# Patient Record
Sex: MALE | Race: WHITE | NOT HISPANIC OR LATINO | Employment: OTHER | URBAN - METROPOLITAN AREA
[De-identification: names, ages, dates, MRNs, and addresses within clinical notes are randomized per-mention and may not be internally consistent; named-entity substitution may affect disease eponyms.]

---

## 2023-09-28 DIAGNOSIS — D75.1 POLYCYTHEMIA: Primary | ICD-10-CM

## 2023-10-16 DIAGNOSIS — D45 PV (POLYCYTHEMIA VERA) (MULTI): Primary | ICD-10-CM

## 2023-10-16 RX ORDER — HYDROXYUREA 500 MG/1
CAPSULE ORAL
COMMUNITY
End: 2024-01-23 | Stop reason: SDUPTHER

## 2023-10-16 RX ORDER — GABAPENTIN 300 MG/1
600 CAPSULE ORAL 3 TIMES DAILY
Qty: 180 CAPSULE | Refills: 2 | Status: SHIPPED | OUTPATIENT
Start: 2023-10-16 | End: 2024-01-23 | Stop reason: SDUPTHER

## 2023-10-16 RX ORDER — GABAPENTIN 300 MG/1
600 CAPSULE ORAL 3 TIMES DAILY
COMMUNITY
End: 2023-10-16 | Stop reason: SDUPTHER

## 2023-11-29 PROBLEM — M25.571 RIGHT ANKLE PAIN: Status: ACTIVE | Noted: 2023-11-29

## 2023-11-29 PROBLEM — I73.81 ERYTHROMELALGIA (CMS-HCC): Status: ACTIVE | Noted: 2023-11-29

## 2023-11-29 PROBLEM — R13.19 ESOPHAGEAL DYSPHAGIA: Status: ACTIVE | Noted: 2023-11-29

## 2023-11-29 PROBLEM — R20.9 SENSORY DISTURBANCE: Status: ACTIVE | Noted: 2023-11-29

## 2023-11-29 PROBLEM — D45 POLYCYTHEMIA VERA (MULTI): Status: ACTIVE | Noted: 2023-11-29

## 2023-11-29 PROBLEM — G89.29 CHRONIC PAIN IN RIGHT FOOT: Status: ACTIVE | Noted: 2023-11-29

## 2023-11-29 PROBLEM — M79.671 CHRONIC PAIN IN RIGHT FOOT: Status: ACTIVE | Noted: 2023-11-29

## 2023-11-29 PROBLEM — M21.40 FLAT FOOT: Status: ACTIVE | Noted: 2023-11-29

## 2023-11-29 PROBLEM — R10.13 EPIGASTRIC PAIN: Status: ACTIVE | Noted: 2023-11-29

## 2023-11-29 PROBLEM — R12 PYROSIS: Status: ACTIVE | Noted: 2023-11-29

## 2023-11-29 RX ORDER — CYPROHEPTADINE HYDROCHLORIDE 4 MG/1
4 TABLET ORAL 3 TIMES DAILY
COMMUNITY
Start: 2023-09-30 | End: 2024-02-29 | Stop reason: SDUPTHER

## 2023-11-29 RX ORDER — HYDROXYZINE HYDROCHLORIDE 25 MG/1
1 TABLET, FILM COATED ORAL 3 TIMES DAILY PRN
COMMUNITY
Start: 2021-05-17

## 2023-11-29 RX ORDER — SODIUM CHLORIDE FOR INHALATION 3 %
VIAL, NEBULIZER (ML) INHALATION
COMMUNITY
Start: 2023-05-08

## 2023-11-29 RX ORDER — ALBUTEROL SULFATE 0.83 MG/ML
SOLUTION RESPIRATORY (INHALATION)
COMMUNITY
Start: 2023-09-04

## 2023-11-29 RX ORDER — SIMVASTATIN 20 MG/1
20 TABLET, FILM COATED ORAL NIGHTLY
COMMUNITY

## 2023-11-29 RX ORDER — LINACLOTIDE 72 UG/1
CAPSULE, GELATIN COATED ORAL
COMMUNITY
Start: 2023-10-03

## 2023-11-29 RX ORDER — AMOXICILLIN 500 MG/1
CAPSULE ORAL
COMMUNITY
Start: 2022-12-15 | End: 2024-02-01 | Stop reason: ALTCHOICE

## 2023-11-29 RX ORDER — DIAZEPAM 5 MG/1
TABLET ORAL
COMMUNITY

## 2023-11-29 RX ORDER — NIRMATRELVIR AND RITONAVIR 300-100 MG
KIT ORAL
COMMUNITY
Start: 2023-08-15 | End: 2023-11-30 | Stop reason: ALTCHOICE

## 2023-11-29 RX ORDER — NITROFURANTOIN 25; 75 MG/1; MG/1
100 CAPSULE ORAL 2 TIMES DAILY
COMMUNITY
Start: 2023-01-18 | End: 2023-01-25

## 2023-11-29 RX ORDER — DIPHENHYDRAMINE HCL 25 MG
TABLET ORAL
COMMUNITY
Start: 2021-05-17

## 2023-11-29 RX ORDER — ALBUTEROL SULFATE 90 UG/1
AEROSOL, METERED RESPIRATORY (INHALATION) EVERY 6 HOURS
COMMUNITY
Start: 2021-05-17

## 2023-11-29 RX ORDER — CHLORHEXIDINE GLUCONATE ORAL RINSE 1.2 MG/ML
SOLUTION DENTAL
COMMUNITY
Start: 2023-09-02

## 2023-11-29 RX ORDER — ESOMEPRAZOLE MAGNESIUM 40 MG/1
1 CAPSULE, DELAYED RELEASE ORAL DAILY
COMMUNITY
Start: 2017-07-11

## 2023-11-29 RX ORDER — FAMOTIDINE 40 MG/1
TABLET, FILM COATED ORAL
COMMUNITY
Start: 2023-02-20

## 2023-11-30 ENCOUNTER — TELEPHONE (OUTPATIENT)
Dept: HEMATOLOGY/ONCOLOGY | Facility: HOSPITAL | Age: 68
End: 2023-11-30

## 2023-11-30 ENCOUNTER — OFFICE VISIT (OUTPATIENT)
Dept: HEMATOLOGY/ONCOLOGY | Facility: CLINIC | Age: 68
End: 2023-11-30
Payer: MEDICARE

## 2023-11-30 ENCOUNTER — LAB (OUTPATIENT)
Dept: LAB | Facility: CLINIC | Age: 68
End: 2023-11-30
Payer: MEDICARE

## 2023-11-30 VITALS
RESPIRATION RATE: 18 BRPM | BODY MASS INDEX: 28.86 KG/M2 | OXYGEN SATURATION: 96 % | DIASTOLIC BLOOD PRESSURE: 74 MMHG | SYSTOLIC BLOOD PRESSURE: 111 MMHG | HEART RATE: 88 BPM | TEMPERATURE: 97.9 F | WEIGHT: 186.07 LBS

## 2023-11-30 DIAGNOSIS — D45 POLYCYTHEMIA VERA (MULTI): Primary | ICD-10-CM

## 2023-11-30 DIAGNOSIS — D75.1 POLYCYTHEMIA: ICD-10-CM

## 2023-11-30 LAB
ALBUMIN SERPL BCP-MCNC: 4.4 G/DL (ref 3.4–5)
ALP SERPL-CCNC: 56 U/L (ref 33–136)
ALT SERPL W P-5'-P-CCNC: 17 U/L (ref 10–52)
ANION GAP SERPL CALC-SCNC: 10 MMOL/L (ref 10–20)
AST SERPL W P-5'-P-CCNC: 23 U/L (ref 9–39)
BASOPHILS # BLD AUTO: 0.03 X10*3/UL (ref 0–0.1)
BASOPHILS NFR BLD AUTO: 1.1 %
BILIRUB SERPL-MCNC: 1.9 MG/DL (ref 0–1.2)
BUN SERPL-MCNC: 20 MG/DL (ref 6–23)
CALCIUM SERPL-MCNC: 9.3 MG/DL (ref 8.6–10.3)
CHLORIDE SERPL-SCNC: 106 MMOL/L (ref 98–107)
CO2 SERPL-SCNC: 27 MMOL/L (ref 21–32)
CREAT SERPL-MCNC: 0.83 MG/DL (ref 0.5–1.3)
DACRYOCYTES BLD QL SMEAR: NORMAL
EOSINOPHIL # BLD AUTO: 0.07 X10*3/UL (ref 0–0.7)
EOSINOPHIL NFR BLD AUTO: 2.5 %
ERYTHROCYTE [DISTWIDTH] IN BLOOD BY AUTOMATED COUNT: 17 % (ref 11.5–14.5)
GFR SERPL CREATININE-BSD FRML MDRD: >90 ML/MIN/1.73M*2
GLUCOSE SERPL-MCNC: 104 MG/DL (ref 74–99)
HCT VFR BLD AUTO: 25.5 % (ref 41–52)
HGB BLD-MCNC: 8.6 G/DL (ref 13.5–17.5)
IMM GRANULOCYTES # BLD AUTO: 0.02 X10*3/UL (ref 0–0.7)
IMM GRANULOCYTES NFR BLD AUTO: 0.7 % (ref 0–0.9)
LYMPHOCYTES # BLD AUTO: 0.98 X10*3/UL (ref 1.2–4.8)
LYMPHOCYTES NFR BLD AUTO: 34.9 %
MCH RBC QN AUTO: 48 PG (ref 26–34)
MCHC RBC AUTO-ENTMCNC: 33.7 G/DL (ref 32–36)
MCV RBC AUTO: 143 FL (ref 80–100)
MONOCYTES # BLD AUTO: 0.1 X10*3/UL (ref 0.1–1)
MONOCYTES NFR BLD AUTO: 3.6 %
NEUTROPHILS # BLD AUTO: 1.61 X10*3/UL (ref 1.2–7.7)
NEUTROPHILS NFR BLD AUTO: 57.2 %
NRBC BLD-RTO: ABNORMAL /100{WBCS}
OVALOCYTES BLD QL SMEAR: NORMAL
PATH REVIEW-CBC DIFFERENTIAL: NORMAL
PLATELET # BLD AUTO: 128 X10*3/UL (ref 150–450)
POLYCHROMASIA BLD QL SMEAR: NORMAL
POTASSIUM SERPL-SCNC: 4.4 MMOL/L (ref 3.5–5.3)
PROT SERPL-MCNC: 7.3 G/DL (ref 6.4–8.2)
RBC # BLD AUTO: 1.79 X10*6/UL (ref 4.5–5.9)
RBC MORPH BLD: NORMAL
SODIUM SERPL-SCNC: 139 MMOL/L (ref 136–145)
STOMATOCYTES BLD QL SMEAR: NORMAL
WBC # BLD AUTO: 2.8 X10*3/UL (ref 4.4–11.3)

## 2023-11-30 PROCEDURE — 80053 COMPREHEN METABOLIC PANEL: CPT

## 2023-11-30 PROCEDURE — 1126F AMNT PAIN NOTED NONE PRSNT: CPT | Performed by: INTERNAL MEDICINE

## 2023-11-30 PROCEDURE — 1159F MED LIST DOCD IN RCRD: CPT | Performed by: INTERNAL MEDICINE

## 2023-11-30 PROCEDURE — 36415 COLL VENOUS BLD VENIPUNCTURE: CPT

## 2023-11-30 PROCEDURE — 1036F TOBACCO NON-USER: CPT | Performed by: INTERNAL MEDICINE

## 2023-11-30 PROCEDURE — 99214 OFFICE O/P EST MOD 30 MIN: CPT | Performed by: INTERNAL MEDICINE

## 2023-11-30 PROCEDURE — 85025 COMPLETE CBC W/AUTO DIFF WBC: CPT

## 2023-11-30 PROCEDURE — 85060 BLOOD SMEAR INTERPRETATION: CPT | Performed by: PATHOLOGY

## 2023-11-30 ASSESSMENT — ENCOUNTER SYMPTOMS
JOINT SWELLING: 0
ADENOPATHY: 0
ACTIVITY CHANGE: 0
CONFUSION: 0
SINUS PAIN: 0
NAUSEA: 0
COUGH: 0
RHINORRHEA: 0
FEVER: 0
CHILLS: 0
BRUISES/BLEEDS EASILY: 0
BACK PAIN: 0
CONSTIPATION: 0
UNEXPECTED WEIGHT CHANGE: 0
NERVOUS/ANXIOUS: 0
FLANK PAIN: 0
SINUS PRESSURE: 0
DIAPHORESIS: 0
OCCASIONAL FEELINGS OF UNSTEADINESS: 0
VOMITING: 0
SORE THROAT: 0
WEAKNESS: 0
NUMBNESS: 0
DYSPHORIC MOOD: 0
DIFFICULTY URINATING: 0
HEADACHES: 0
LOSS OF SENSATION IN FEET: 0
FATIGUE: 0
DIARRHEA: 0
ABDOMINAL PAIN: 0
SHORTNESS OF BREATH: 0
LIGHT-HEADEDNESS: 0
APPETITE CHANGE: 0
DEPRESSION: 0

## 2023-11-30 ASSESSMENT — PAIN SCALES - GENERAL: PAINLEVEL: 0-NO PAIN

## 2023-11-30 NOTE — TELEPHONE ENCOUNTER
Patient CBC at the end of his clinic today resulted after patient left, patient CBC demonstrated hemoglobin of 8.6, white blood cells 2.8, platelets 120s.  Overall this is significantly lower than his recent trends where he previously been stable on hydroxyurea doses of 1000 mg twice daily.    Attempted to call patient and ask him to temporarily hold his hydroxyurea with potential restart after we remonitor in 2 weeks.  We put in labs for 2 weeks.  I attempted both the home number and the mobile number, no answer at both.  Left a message with the mobile number.  I left a request that the patient call back and acknowledge receipt of instructions to hold his medicine.

## 2023-11-30 NOTE — PROGRESS NOTES
Patient ID: Mark Maria is a 68 y.o. male.  Referring Physician: Jeanie To, APRN-CNP  [address] [place] [address]  Primary Care Provider: Saeid Allen MD    Date of Service:  11/30/2023    ASSESSMENT and PLAN:      Problem List Items Addressed This Visit       Polycythemia vera (CMS/HCC) - Primary    Current Assessment & Plan     Reports that he feels well, and seemed overall stable on his dose, but unfortunately his labs returned showing a new onset anemia, borderline thrombocytopenia, and borderline leukopenia.  This is highly likely from the hydroxyurea, tried to contact patient after his appointment have him hold his hydroxyurea, will have him come back in 2 weeks to potentially dose.  We will likely a dose reduction.  Given his history of sensitivity will likely restart it 500 twice a day, consider up titration to 1500/day  Polycythemia vera (some evidence  of clonal evolution with a new SF3B1 mutations)  Diagnostics:  - Bone marrow biopsy on 3/24/22 - no evidence of myelofibrosis progression   Treatment:  - HOLD hydroxyrrea  Disease Monitoring:  - None at this time   Supportive Care/Toxicity Management  - Cyproheptadine 4mg 3x daily for itching  - continue prn diazepam for itching prevention with showers  - Gabapentin 600mg 3x daily   - Oral B12 1000mcg daily   Antimicrobial Prophylaxis:  - None at this time   IV access:  - Peripheral Access only          Relevant Orders    CBC and Auto Differential     Other Visit Diagnoses       Polycythemia        Relevant Orders    Clinic Appointment Request JEANIE TO    Infusion Appointment Request [facility] INFUSION    CBC and Auto Differential (Completed)    Comprehensive Metabolic Panel (Completed)                      Oncology History Overview Note   Polycythemia Vera:   diagnosis:  originally referred to hematology 7/2017 with elevated red cell mass, mild leukocytosis and elevated platelets.  He had itching and burning  symptoms that were worsening with baths as wlel.  No history of thrombosis at the time of presentation.  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  Treatment:  1. hydroxyurea single agent   Well tolerated wtih reasonable control of disease through Fall 2021, when blood counts dropped consdierably, and patient proved exquisitely senstivie to hydroxyurea. Referred to Dr. Ortega with concern for hemolysis, but workup was not suggestive of  introvascular hemolysis. Noted elevated MCV, but did not e low haptoglobin, and was suspicious for stem cell disorder.  peripheral blood testing demonstrated a new SF3B1 mutation in addition to the known JAK2 mutation.       bone marrow biopsy on 3/24/22 showed hypercellular bone marrow (50-60%) with trilineage hematopoiesis and atypical megakaryotytic hyperplasia consistent with a myeloid neoplasm , there was no evidence of myelofibrosis progression.  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  The clinical picture with  nausea counts and symptoms that may be analogous to B-symptoms, and so treatment as primary myelofibrosis was considered.   - restarted hydroxyurea, 500mg/day on 4/7/22  - Currently on hydroxyurea, 1000mg BID 4/20/23  -11/30/2023 - recurring anemia and borderline anemia      Polycythemia vera (CMS/HCC)   11/29/2023 Initial Diagnosis    Polycythemia vera (CMS/HCC)              Subjective       History of Present Illness:  Reviewed overall plan - Oct & Nov were good - no issues with the pruitis, or itching - diazepam still makes a big difference - but using it every single daily.   No fevers, no chills, no illness. Got his covid booster and flu shot.          Reviewed and Past Medical History, Past Surgical History, Family History, and Social History:    Review of Systems   Constitutional:   Negative for activity change, appetite change, chills, diaphoresis, fatigue, fever and unexpected weight change.   HENT:  Negative for congestion, mouth sores, nosebleeds, rhinorrhea, sinus pressure, sinus pain and sore throat.    Eyes:  Negative for visual disturbance.   Respiratory:  Negative for cough and shortness of breath.    Cardiovascular:  Negative for chest pain and leg swelling.   Gastrointestinal:  Negative for abdominal pain, constipation, diarrhea, nausea and vomiting.   Genitourinary:  Negative for difficulty urinating and flank pain.   Musculoskeletal:  Negative for back pain and joint swelling.   Skin:  Negative for rash.        Still has intermittent issues with pruritus, doing much better   Neurological:  Negative for weakness, light-headedness, numbness and headaches.   Hematological:  Negative for adenopathy. Does not bruise/bleed easily.   Psychiatric/Behavioral:  Negative for confusion and dysphoric mood. The patient is not nervous/anxious.        Home Medications and Adherence Reviewed with Patient.       Objective      VS:  /74 (BP Location: Right arm, Patient Position: Sitting, BP Cuff Size: Adult)   Pulse 88   Temp 36.6 °C (97.9 °F) (Temporal)   Resp 18   Wt 84.4 kg (186 lb 1.1 oz)   SpO2 96%   BMI 28.86 kg/m²   BSA: 2 meters squared    Physical Exam  Constitutional:       Appearance: Normal appearance.   HENT:      Mouth/Throat:      Mouth: Mucous membranes are moist.   Eyes:      Conjunctiva/sclera: Conjunctivae normal.      Pupils: Pupils are equal, round, and reactive to light.   Cardiovascular:      Rate and Rhythm: Normal rate and regular rhythm.      Heart sounds: Normal heart sounds.   Pulmonary:      Effort: Pulmonary effort is normal.      Breath sounds: Normal breath sounds.   Abdominal:      General: Abdomen is flat.      Palpations: Abdomen is soft. There is no splenomegaly.   Musculoskeletal:         General: Normal range of motion.   Skin:     General: Skin is  warm and dry.   Neurological:      General: No focal deficit present.      Mental Status: He is oriented to person, place, and time.   Psychiatric:         Mood and Affect: Mood normal.         Behavior: Behavior normal.         Laboratory:  Lab on 11/30/2023   Component Date Value Ref Range Status    WBC 11/30/2023 2.8 (L)  4.4 - 11.3 x10*3/uL Final    nRBC 11/30/2023    Final    RBC 11/30/2023 1.79 (L)  4.50 - 5.90 x10*6/uL Final    Hemoglobin 11/30/2023 8.6 (L)  13.5 - 17.5 g/dL Final    Hematocrit 11/30/2023 25.5 (L)  41.0 - 52.0 % Final    MCV 11/30/2023 143 (H)  80 - 100 fL Final    MCH 11/30/2023 48.0 (H)  26.0 - 34.0 pg Final    MCHC 11/30/2023 33.7  32.0 - 36.0 g/dL Final    RDW 11/30/2023 17.0 (H)  11.5 - 14.5 % Final    Platelets 11/30/2023 128 (L)  150 - 450 x10*3/uL Final    Neutrophils % 11/30/2023 57.2  40.0 - 80.0 % Final    Immature Granulocytes %, Automated 11/30/2023 0.7  0.0 - 0.9 % Final    Lymphocytes % 11/30/2023 34.9  13.0 - 44.0 % Final    Monocytes % 11/30/2023 3.6  2.0 - 10.0 % Final    Eosinophils % 11/30/2023 2.5  0.0 - 6.0 % Final    Basophils % 11/30/2023 1.1  0.0 - 2.0 % Final    Neutrophils Absolute 11/30/2023 1.61  1.20 - 7.70 x10*3/uL Final    Immature Granulocytes Absolute, Au* 11/30/2023 0.02  0.00 - 0.70 x10*3/uL Final    Lymphocytes Absolute 11/30/2023 0.98 (L)  1.20 - 4.80 x10*3/uL Final    Monocytes Absolute 11/30/2023 0.10  0.10 - 1.00 x10*3/uL Final    Eosinophils Absolute 11/30/2023 0.07  0.00 - 0.70 x10*3/uL Final    Basophils Absolute 11/30/2023 0.03  0.00 - 0.10 x10*3/uL Final    Glucose 11/30/2023 104 (H)  74 - 99 mg/dL Final    Sodium 11/30/2023 139  136 - 145 mmol/L Final    Potassium 11/30/2023 4.4  3.5 - 5.3 mmol/L Final    Chloride 11/30/2023 106  98 - 107 mmol/L Final    Bicarbonate 11/30/2023 27  21 - 32 mmol/L Final    Anion Gap 11/30/2023 10  10 - 20 mmol/L Final    Urea Nitrogen 11/30/2023 20  6 - 23 mg/dL Final    Creatinine 11/30/2023 0.83  0.50 - 1.30  mg/dL Final    eGFR 11/30/2023 >90  >60 mL/min/1.73m*2 Final    Calcium 11/30/2023 9.3  8.6 - 10.3 mg/dL Final    Albumin 11/30/2023 4.4  3.4 - 5.0 g/dL Final    Alkaline Phosphatase 11/30/2023 56  33 - 136 U/L Final    Total Protein 11/30/2023 7.3  6.4 - 8.2 g/dL Final    AST 11/30/2023 23  9 - 39 U/L Final    Bilirubin, Total 11/30/2023 1.9 (H)  0.0 - 1.2 mg/dL Final    ALT 11/30/2023 17  10 - 52 U/L Final    RBC Morphology 11/30/2023 See Below   Final    Polychromasia 11/30/2023 Mild   Final    Ovalocytes 11/30/2023 Few   Final    Teardrop Cells 11/30/2023 Few   Final    Stomatocytes 11/30/2023 Few   Final           Moi Andrade MD

## 2023-11-30 NOTE — LETTER
November 30, 2023     Saeid Allen MD  [address] [address]  [facility] [address]    Patient: Mark Maria   YOB: 1955   Date of Visit: 11/30/2023       Dear Dr. Saeid Allen MD:    Thank you for referring Mark Maria to me for evaluation. Below are my notes for this consultation.  If you have questions, please do not hesitate to call me. I look forward to following your patient along with you.       Sincerely,     Moi Andrade MD      CC: No Recipients  ______________________________________________________________________________________    Patient ID: Mark Maria is a 68 y.o. male.  Referring Physician: Jeanie To, APRN-CNP  [address] [place] [address]  Primary Care Provider: Saeid Allen MD    Date of Service:  11/30/2023    ASSESSMENT and PLAN:      Problem List Items Addressed This Visit       Polycythemia vera (CMS/HCC) - Primary    Current Assessment & Plan     Reports that he feels well, and seemed overall stable on his dose, but unfortunately his labs returned showing a new onset anemia, borderline thrombocytopenia, and borderline leukopenia.  This is highly likely from the hydroxyurea, tried to contact patient after his appointment have him hold his hydroxyurea, will have him come back in 2 weeks to potentially dose.  We will likely a dose reduction.  Given his history of sensitivity will likely restart it 500 twice a day, consider up titration to 1500/day  Polycythemia vera (some evidence  of clonal evolution with a new SF3B1 mutations)  Diagnostics:  - Bone marrow biopsy on 3/24/22 - no evidence of myelofibrosis progression   Treatment:  - HOLD hydroxyrrea  Disease Monitoring:  - None at this time   Supportive Care/Toxicity Management  - Cyproheptadine 4mg 3x daily for itching  - continue prn diazepam for itching prevention with showers  - Gabapentin 600mg 3x daily   - Oral B12  1000mcg daily   Antimicrobial Prophylaxis:  - None at this time   IV access:  - Peripheral Access only          Relevant Orders    CBC and Auto Differential     Other Visit Diagnoses       Polycythemia        Relevant Orders    Clinic Appointment Request HUGO ASHRAF    Infusion Appointment Request [facility] INFUSION    CBC and Auto Differential (Completed)    Comprehensive Metabolic Panel (Completed)                      Oncology History Overview Note   Polycythemia Vera:   diagnosis:  originally referred to hematology 7/2017 with elevated red cell mass, mild leukocytosis and elevated platelets.  He had itching and burning symptoms that were worsening with baths as wlel.  No history of thrombosis at the time of presentation.  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  Treatment:  1. hydroxyurea single agent   Well tolerated wtih reasonable control of disease through Fall 2021, when blood counts dropped consdierably, and patient proved exquisitely senstivie to hydroxyurea. Referred to Dr. Ortega with concern for hemolysis, but workup was not suggestive of  introvascular hemolysis. Noted elevated MCV, but did not e low haptoglobin, and was suspicious for stem cell disorder.  peripheral blood testing demonstrated a new SF3B1 mutation in addition to the known JAK2 mutation.       bone marrow biopsy on 3/24/22 showed hypercellular bone marrow (50-60%) with trilineage hematopoiesis and atypical megakaryotytic hyperplasia consistent with a myeloid neoplasm , there was no evidence of myelofibrosis progression.  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  The clinical picture with  nausea counts and symptoms that may be analogous to B-symptoms, and so treatment as primary myelofibrosis was considered.   - restarted hydroxyurea, 500mg/day on  4/7/22  - Currently on hydroxyurea, 1000mg BID 4/20/23  -11/30/2023 - recurring anemia and borderline anemia      Polycythemia vera (CMS/HCC)   11/29/2023 Initial Diagnosis    Polycythemia vera (CMS/HCC)              Subjective      History of Present Illness:  Reviewed overall plan - Oct & Nov were good - no issues with the pruitis, or itching - diazepam still makes a big difference - but using it every single daily.   No fevers, no chills, no illness. Got his covid booster and flu shot.          Reviewed and Past Medical History, Past Surgical History, Family History, and Social History:    Review of Systems   Constitutional:  Negative for activity change, appetite change, chills, diaphoresis, fatigue, fever and unexpected weight change.   HENT:  Negative for congestion, mouth sores, nosebleeds, rhinorrhea, sinus pressure, sinus pain and sore throat.    Eyes:  Negative for visual disturbance.   Respiratory:  Negative for cough and shortness of breath.    Cardiovascular:  Negative for chest pain and leg swelling.   Gastrointestinal:  Negative for abdominal pain, constipation, diarrhea, nausea and vomiting.   Genitourinary:  Negative for difficulty urinating and flank pain.   Musculoskeletal:  Negative for back pain and joint swelling.   Skin:  Negative for rash.        Still has intermittent issues with pruritus, doing much better   Neurological:  Negative for weakness, light-headedness, numbness and headaches.   Hematological:  Negative for adenopathy. Does not bruise/bleed easily.   Psychiatric/Behavioral:  Negative for confusion and dysphoric mood. The patient is not nervous/anxious.        Home Medications and Adherence Reviewed with Patient.       Objective     VS:  /74 (BP Location: Right arm, Patient Position: Sitting, BP Cuff Size: Adult)   Pulse 88   Temp 36.6 °C (97.9 °F) (Temporal)   Resp 18   Wt 84.4 kg (186 lb 1.1 oz)   SpO2 96%   BMI 28.86 kg/m²   BSA: 2 meters squared    Physical  Exam  Constitutional:       Appearance: Normal appearance.   HENT:      Mouth/Throat:      Mouth: Mucous membranes are moist.   Eyes:      Conjunctiva/sclera: Conjunctivae normal.      Pupils: Pupils are equal, round, and reactive to light.   Cardiovascular:      Rate and Rhythm: Normal rate and regular rhythm.      Heart sounds: Normal heart sounds.   Pulmonary:      Effort: Pulmonary effort is normal.      Breath sounds: Normal breath sounds.   Abdominal:      General: Abdomen is flat.      Palpations: Abdomen is soft. There is no splenomegaly.   Musculoskeletal:         General: Normal range of motion.   Skin:     General: Skin is warm and dry.   Neurological:      General: No focal deficit present.      Mental Status: He is oriented to person, place, and time.   Psychiatric:         Mood and Affect: Mood normal.         Behavior: Behavior normal.         Laboratory:  Lab on 11/30/2023   Component Date Value Ref Range Status   • WBC 11/30/2023 2.8 (L)  4.4 - 11.3 x10*3/uL Final   • nRBC 11/30/2023    Final   • RBC 11/30/2023 1.79 (L)  4.50 - 5.90 x10*6/uL Final   • Hemoglobin 11/30/2023 8.6 (L)  13.5 - 17.5 g/dL Final   • Hematocrit 11/30/2023 25.5 (L)  41.0 - 52.0 % Final   • MCV 11/30/2023 143 (H)  80 - 100 fL Final   • MCH 11/30/2023 48.0 (H)  26.0 - 34.0 pg Final   • MCHC 11/30/2023 33.7  32.0 - 36.0 g/dL Final   • RDW 11/30/2023 17.0 (H)  11.5 - 14.5 % Final   • Platelets 11/30/2023 128 (L)  150 - 450 x10*3/uL Final   • Neutrophils % 11/30/2023 57.2  40.0 - 80.0 % Final   • Immature Granulocytes %, Automated 11/30/2023 0.7  0.0 - 0.9 % Final   • Lymphocytes % 11/30/2023 34.9  13.0 - 44.0 % Final   • Monocytes % 11/30/2023 3.6  2.0 - 10.0 % Final   • Eosinophils % 11/30/2023 2.5  0.0 - 6.0 % Final   • Basophils % 11/30/2023 1.1  0.0 - 2.0 % Final   • Neutrophils Absolute 11/30/2023 1.61  1.20 - 7.70 x10*3/uL Final   • Immature Granulocytes Absolute, Au* 11/30/2023 0.02  0.00 - 0.70 x10*3/uL Final   •  Lymphocytes Absolute 11/30/2023 0.98 (L)  1.20 - 4.80 x10*3/uL Final   • Monocytes Absolute 11/30/2023 0.10  0.10 - 1.00 x10*3/uL Final   • Eosinophils Absolute 11/30/2023 0.07  0.00 - 0.70 x10*3/uL Final   • Basophils Absolute 11/30/2023 0.03  0.00 - 0.10 x10*3/uL Final   • Glucose 11/30/2023 104 (H)  74 - 99 mg/dL Final   • Sodium 11/30/2023 139  136 - 145 mmol/L Final   • Potassium 11/30/2023 4.4  3.5 - 5.3 mmol/L Final   • Chloride 11/30/2023 106  98 - 107 mmol/L Final   • Bicarbonate 11/30/2023 27  21 - 32 mmol/L Final   • Anion Gap 11/30/2023 10  10 - 20 mmol/L Final   • Urea Nitrogen 11/30/2023 20  6 - 23 mg/dL Final   • Creatinine 11/30/2023 0.83  0.50 - 1.30 mg/dL Final   • eGFR 11/30/2023 >90  >60 mL/min/1.73m*2 Final   • Calcium 11/30/2023 9.3  8.6 - 10.3 mg/dL Final   • Albumin 11/30/2023 4.4  3.4 - 5.0 g/dL Final   • Alkaline Phosphatase 11/30/2023 56  33 - 136 U/L Final   • Total Protein 11/30/2023 7.3  6.4 - 8.2 g/dL Final   • AST 11/30/2023 23  9 - 39 U/L Final   • Bilirubin, Total 11/30/2023 1.9 (H)  0.0 - 1.2 mg/dL Final   • ALT 11/30/2023 17  10 - 52 U/L Final   • RBC Morphology 11/30/2023 See Below   Final   • Polychromasia 11/30/2023 Mild   Final   • Ovalocytes 11/30/2023 Few   Final   • Teardrop Cells 11/30/2023 Few   Final   • Stomatocytes 11/30/2023 Few   Final           Moi Andrade MD

## 2023-11-30 NOTE — ASSESSMENT & PLAN NOTE
Reports that he feels well, and seemed overall stable on his dose, but unfortunately his labs returned showing a new onset anemia, borderline thrombocytopenia, and borderline leukopenia.  This is highly likely from the hydroxyurea, tried to contact patient after his appointment have him hold his hydroxyurea, will have him come back in 2 weeks to potentially dose.  We will likely a dose reduction.  Given his history of sensitivity will likely restart it 500 twice a day, consider up titration to 1500/day  Polycythemia vera (some evidence  of clonal evolution with a new SF3B1 mutations)  Diagnostics:  - Bone marrow biopsy on 3/24/22 - no evidence of myelofibrosis progression   Treatment:  - HOLD hydroxyrrea  Disease Monitoring:  - None at this time   Supportive Care/Toxicity Management  - Cyproheptadine 4mg 3x daily for itching  - continue prn diazepam for itching prevention with showers  - Gabapentin 600mg 3x daily   - Oral B12 1000mcg daily   Antimicrobial Prophylaxis:  - None at this time   IV access:  - Peripheral Access only

## 2024-01-23 ENCOUNTER — TELEPHONE (OUTPATIENT)
Dept: ADMISSION | Facility: HOSPITAL | Age: 69
End: 2024-01-23
Payer: MEDICARE

## 2024-01-23 DIAGNOSIS — D45 POLYCYTHEMIA VERA (MULTI): Primary | ICD-10-CM

## 2024-01-23 DIAGNOSIS — D45 PV (POLYCYTHEMIA VERA) (MULTI): ICD-10-CM

## 2024-01-23 RX ORDER — HYDROXYUREA 500 MG/1
CAPSULE ORAL
Qty: 60 CAPSULE | Refills: 3 | Status: SHIPPED | OUTPATIENT
Start: 2024-01-23 | End: 2024-03-07 | Stop reason: SDUPTHER

## 2024-01-23 RX ORDER — GABAPENTIN 300 MG/1
600 CAPSULE ORAL 3 TIMES DAILY
Qty: 180 CAPSULE | Refills: 2 | Status: SHIPPED | OUTPATIENT
Start: 2024-01-23 | End: 2024-05-09 | Stop reason: SDUPTHER

## 2024-01-23 NOTE — TELEPHONE ENCOUNTER
Mark Maria called the refill line for Gabapentin. Medication pended to team to approve and submit. Next FUV is 2/1.

## 2024-01-30 NOTE — ASSESSMENT & PLAN NOTE
"Recent hospitalization at [facility] with pancytopenia d/t hydrea toxicity. Transfused 4 units PRBC during admission.  \"Mild splenomegaly\" noted on US 1/30/24. Feeling much better.  Hydrea held    Polycythemia vera (some evidence  of clonal evolution with a new SF3B1 mutations)  Diagnostics:  - Bone marrow biopsy on 3/24/22 - no evidence of myelofibrosis progression   Treatment:  - HOLD hydroxyrea  - CBC Q 2 weeks until counts recover. Repeat Bmbx if no recovery of counts  Disease Monitoring:  - None at this time   Supportive Care/Toxicity Management  - Cyproheptadine 4mg 3x daily for itching  - continue prn diazepam for itching prevention with showers  - Gabapentin 600mg 3x daily   - Oral B12 1000mcg daily   Antimicrobial Prophylaxis:  - None at this time   IV access:  - Peripheral Access only   "

## 2024-01-30 NOTE — PROGRESS NOTES
Patient ID: Mark Maria is a 68 y.o. male.  Referring Physician: Moi Andrade MD  [address] [place] [address]  Primary Care Provider: Saeid Allen MD    Date of Service:  2/1/2024    ASSESSMENT and PLAN:    Polycythemia vera (CMS/HCC)  Recent hospitalization at [facility] with pancytopenia d/t hydrea toxicity. Transfused 4 units PRBC during admission.  Feeling much better.  Hydrea held    Polycythemia vera (some evidence  of clonal evolution with a new SF3B1 mutations)  Diagnostics:  - Bone marrow biopsy on 3/24/22 - no evidence of myelofibrosis progression   Treatment:  - HOLD hydroxyrea  - CBC Q 2 weeks until counts recover. Repeat Bmbx if no recovery of counts  Disease Monitoring:  - None at this time   Supportive Care/Toxicity Management  - Cyproheptadine 4mg 3x daily for itching  - continue prn diazepam for itching prevention with showers  - Gabapentin 600mg 3x daily   - Oral B12 1000mcg daily   Antimicrobial Prophylaxis:  - None at this time   IV access:  - Peripheral Access only        Oncology History Overview Note   Polycythemia Vera:   diagnosis:  originally referred to hematology 7/2017 with elevated red cell mass, mild leukocytosis and elevated platelets.  He had itching and burning symptoms that were worsening with baths as wlel.  No history of thrombosis at the time of presentation.  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  Treatment:  1. hydroxyurea single agent   Well tolerated wtih reasonable control of disease through Fall 2021, when blood counts dropped consdierably, and patient proved exquisitely senstivie to hydroxyurea. Referred to Dr. Ortega with concern for hemolysis, but workup was not suggestive of  introvascular hemolysis. Noted elevated MCV, but did not e low haptoglobin, and was suspicious for stem cell disorder.  peripheral blood testing demonstrated a new SF3B1 mutation in addition to the known JAK2  mutation.       bone marrow biopsy on 3/24/22 showed hypercellular bone marrow (50-60%) with trilineage hematopoiesis and atypical megakaryotytic hyperplasia consistent with a myeloid neoplasm , there was no evidence of myelofibrosis progression.  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  The clinical picture with  nausea counts and symptoms that may be analogous to B-symptoms, and so treatment as primary myelofibrosis was considered.   - restarted hydroxyurea, 500mg/day on 4/7/22  - Currently on hydroxyurea, 1000mg BID 4/20/23  -11/30/2023 - recurring anemia and borderline anemia      Polycythemia vera (CMS/HCC)   11/29/2023 Initial Diagnosis    Polycythemia vera (CMS/HCC)        SUBJECTIVE:  History of Present Illness:  Patient presents today, accompanied by his wife, for follow up.  Last week he started to feel weak and SOB.  On Friday while sitting at his table his wife found him slumped over and called 911.  He was found to have a HGB of 6.7 and was admitted to [facility].  Hydrea was held and he was transfused PRBC.  He was discharged this past Tuesday.  Reports feeling better, although still not at his baseline.  Denies bleeding.          Review of Systems   Constitutional:  Negative for chills, fever and unexpected weight change.   HENT:  Negative for mouth sores and nosebleeds.    Respiratory:  Negative for cough, chest tightness and shortness of breath.    Cardiovascular:  Negative for chest pain and leg swelling.   Gastrointestinal:  Negative for abdominal pain, blood in stool, constipation, diarrhea, nausea and vomiting.   Genitourinary:  Negative for dysuria and hematuria.   Skin:  Negative for rash.   Neurological:  Negative for dizziness, light-headedness, numbness and headaches.     OBJECTIVE:  KPS: Karnofsky Score: 90 - Able to carry on normal activity; minor  signs or symptoms of disease    VS:  /72 (BP Location: Right arm)   Pulse 85   Temp 36.4 °C (97.5 °F) (Temporal)   Resp 18   Wt 84.2 kg (185 lb 10 oz)   SpO2 92%   BMI 28.80 kg/m²   BSA: 2 meters squared    Physical Exam  Constitutional:       Appearance: Normal appearance.   HENT:      Head: Normocephalic.   Eyes:      Pupils: Pupils are equal, round, and reactive to light.   Cardiovascular:      Rate and Rhythm: Normal rate and regular rhythm.   Pulmonary:      Effort: Pulmonary effort is normal.      Breath sounds: Normal breath sounds.   Abdominal:      General: Bowel sounds are normal.      Palpations: Abdomen is soft. There is splenomegaly.      Comments: Spleen palpable just at costal margin   Musculoskeletal:         General: Normal range of motion.      Cervical back: Normal range of motion and neck supple.   Lymphadenopathy:      Comments: No lymphadenopathy   Skin:     General: Skin is warm and dry.      Findings: No lesion or rash.   Neurological:      General: No focal deficit present.      Mental Status: He is alert and oriented to person, place, and time. Mental status is at baseline.      Comments: No numbness or tingling   Psychiatric:         Mood and Affect: Mood normal.       Current Outpatient Medications   Medication Instructions    albuterol 2.5 mg /3 mL (0.083 %) nebulizer solution INHALE THE CONTENTS OF 1 VIAL (3 ML) VIA NEBULIZER TWICE A DAY    albuterol 90 mcg/actuation inhaler inhalation, Every 6 hours    chlorhexidine (Peridex) 0.12 % solution RINSE MOUTH WITH 5 TO 10 ML ONCE DAILY AFTER BRUSHING FOR 1 MINUTE AND SPIT OUT    cyproheptadine (PERIACTIN) 4 mg, oral, 3 times daily    diazePAM (Valium) 5 mg tablet TAKE ONE TABLET BY MOUTH TWO TIMES A DAY AS PREMED FOR SHOWERS AND TRIGGERS FOR NEURO REACTIVITY    diphenhydrAMINE (Sominex) 25 mg tablet oral    esomeprazole (NexIUM) 40 mg DR capsule 1 capsule, oral, Daily    famotidine (Pepcid) 40 mg tablet     gabapentin (NEURONTIN)  600 mg, oral, 3 times daily    hydroxyurea (Hydrea) 500 mg capsule TAKE TWO CAPSULES BY MOUTH ONCE A DAY    hydrOXYzine HCL (Atarax) 25 mg tablet 1 tablet, oral, 3 times daily PRN    Linzess 72 mcg capsule TAKE ONE CAPSULE BY MOUTH ONCE DAILY ON AN EMPTY STOMACH. SWALLOW WHOLE. DO NOT CRUSH OR CHEW    simvastatin (ZOCOR) 20 mg, oral, Nightly    sodium chloride 3 % nebulizer solution INHALE THE CONTENTS OF 1 VIAL (4 ML) VIA NEBULIZER TWICE DAILY      Laboratory:  The pertinent laboratory results were reviewed and discussed with the patient.    Lab Results   Component Value Date    WBC 2.9 (L) 02/01/2024    HCT 26.8 (L) 02/01/2024    HGB 9.0 (L) 02/01/2024     (L) 02/01/2024    K 4.4 02/01/2024    CALCIUM 9.4 02/01/2024     02/01/2024    MG 2.00 02/16/2023    BILITOT 1.5 (H) 02/01/2024    ALT 8 (L) 02/01/2024    AST 18 02/01/2024    BUN 18 02/01/2024    CREATININE 0.81 02/01/2024      Note: for a comprehensive list of the patient's lab results, access the Results Review activity.    RTC:  Labs in 2 weeks  3/7 Dr. Andrade follow up    Jeanie To, APRN-CNP

## 2024-02-01 ENCOUNTER — OFFICE VISIT (OUTPATIENT)
Dept: HEMATOLOGY/ONCOLOGY | Facility: CLINIC | Age: 69
End: 2024-02-01
Payer: MEDICARE

## 2024-02-01 ENCOUNTER — LAB (OUTPATIENT)
Dept: LAB | Facility: CLINIC | Age: 69
End: 2024-02-01
Payer: MEDICARE

## 2024-02-01 ENCOUNTER — INFUSION (OUTPATIENT)
Dept: HEMATOLOGY/ONCOLOGY | Facility: CLINIC | Age: 69
End: 2024-02-01
Payer: MEDICARE

## 2024-02-01 VITALS
OXYGEN SATURATION: 92 % | BODY MASS INDEX: 28.8 KG/M2 | DIASTOLIC BLOOD PRESSURE: 72 MMHG | TEMPERATURE: 97.5 F | RESPIRATION RATE: 18 BRPM | SYSTOLIC BLOOD PRESSURE: 109 MMHG | HEART RATE: 85 BPM | WEIGHT: 185.63 LBS

## 2024-02-01 DIAGNOSIS — D45 POLYCYTHEMIA VERA (MULTI): Primary | ICD-10-CM

## 2024-02-01 DIAGNOSIS — D75.1 POLYCYTHEMIA: ICD-10-CM

## 2024-02-01 DIAGNOSIS — D45 POLYCYTHEMIA VERA (MULTI): ICD-10-CM

## 2024-02-01 PROBLEM — R10.13 EPIGASTRIC PAIN: Status: RESOLVED | Noted: 2023-11-29 | Resolved: 2024-02-01

## 2024-02-01 PROBLEM — R13.19 ESOPHAGEAL DYSPHAGIA: Status: RESOLVED | Noted: 2023-11-29 | Resolved: 2024-02-01

## 2024-02-01 PROBLEM — M25.571 RIGHT ANKLE PAIN: Status: RESOLVED | Noted: 2023-11-29 | Resolved: 2024-02-01

## 2024-02-01 LAB
ALBUMIN SERPL BCP-MCNC: 4.2 G/DL (ref 3.4–5)
ALP SERPL-CCNC: 59 U/L (ref 33–136)
ALT SERPL W P-5'-P-CCNC: 8 U/L (ref 10–52)
ANION GAP SERPL CALC-SCNC: 11 MMOL/L (ref 10–20)
AST SERPL W P-5'-P-CCNC: 18 U/L (ref 9–39)
BASOPHILS # BLD AUTO: 0.02 X10*3/UL (ref 0–0.1)
BASOPHILS NFR BLD AUTO: 0.7 %
BILIRUB DIRECT SERPL-MCNC: 0.3 MG/DL (ref 0–0.3)
BILIRUB SERPL-MCNC: 1.5 MG/DL (ref 0–1.2)
BUN SERPL-MCNC: 18 MG/DL (ref 6–23)
CALCIUM SERPL-MCNC: 9.4 MG/DL (ref 8.6–10.3)
CHLORIDE SERPL-SCNC: 103 MMOL/L (ref 98–107)
CO2 SERPL-SCNC: 28 MMOL/L (ref 21–32)
CREAT SERPL-MCNC: 0.81 MG/DL (ref 0.5–1.3)
DACRYOCYTES BLD QL SMEAR: NORMAL
EGFRCR SERPLBLD CKD-EPI 2021: >90 ML/MIN/1.73M*2
EOSINOPHIL # BLD AUTO: 0.06 X10*3/UL (ref 0–0.7)
EOSINOPHIL NFR BLD AUTO: 2.1 %
ERYTHROCYTE [DISTWIDTH] IN BLOOD BY AUTOMATED COUNT: ABNORMAL %
GLUCOSE SERPL-MCNC: 98 MG/DL (ref 74–99)
HCT VFR BLD AUTO: 26.8 % (ref 41–52)
HGB BLD-MCNC: 9 G/DL (ref 13.5–17.5)
IMM GRANULOCYTES # BLD AUTO: 0.01 X10*3/UL (ref 0–0.7)
IMM GRANULOCYTES NFR BLD AUTO: 0.3 % (ref 0–0.9)
LYMPHOCYTES # BLD AUTO: 1.26 X10*3/UL (ref 1.2–4.8)
LYMPHOCYTES NFR BLD AUTO: 43.3 %
MCH RBC QN AUTO: 39.8 PG (ref 26–34)
MCHC RBC AUTO-ENTMCNC: 33.6 G/DL (ref 32–36)
MCV RBC AUTO: 119 FL (ref 80–100)
MONOCYTES # BLD AUTO: 0.18 X10*3/UL (ref 0.1–1)
MONOCYTES NFR BLD AUTO: 6.2 %
NEUTROPHILS # BLD AUTO: 1.38 X10*3/UL (ref 1.2–7.7)
NEUTROPHILS NFR BLD AUTO: 47.4 %
NRBC BLD-RTO: ABNORMAL /100{WBCS}
OVALOCYTES BLD QL SMEAR: NORMAL
PLATELET # BLD AUTO: 110 X10*3/UL (ref 150–450)
POLYCHROMASIA BLD QL SMEAR: NORMAL
POTASSIUM SERPL-SCNC: 4.4 MMOL/L (ref 3.5–5.3)
PROT SERPL-MCNC: 6.9 G/DL (ref 6.4–8.2)
RBC # BLD AUTO: 2.26 X10*6/UL (ref 4.5–5.9)
RBC MORPH BLD: NORMAL
SODIUM SERPL-SCNC: 138 MMOL/L (ref 136–145)
WBC # BLD AUTO: 2.9 X10*3/UL (ref 4.4–11.3)

## 2024-02-01 PROCEDURE — 36415 COLL VENOUS BLD VENIPUNCTURE: CPT

## 2024-02-01 PROCEDURE — 82248 BILIRUBIN DIRECT: CPT

## 2024-02-01 PROCEDURE — 80053 COMPREHEN METABOLIC PANEL: CPT

## 2024-02-01 PROCEDURE — 1159F MED LIST DOCD IN RCRD: CPT | Performed by: NURSE PRACTITIONER

## 2024-02-01 PROCEDURE — 1160F RVW MEDS BY RX/DR IN RCRD: CPT | Performed by: NURSE PRACTITIONER

## 2024-02-01 PROCEDURE — 99215 OFFICE O/P EST HI 40 MIN: CPT | Performed by: NURSE PRACTITIONER

## 2024-02-01 PROCEDURE — 1126F AMNT PAIN NOTED NONE PRSNT: CPT | Performed by: NURSE PRACTITIONER

## 2024-02-01 PROCEDURE — 85025 COMPLETE CBC W/AUTO DIFF WBC: CPT

## 2024-02-01 PROCEDURE — 1036F TOBACCO NON-USER: CPT | Performed by: NURSE PRACTITIONER

## 2024-02-01 ASSESSMENT — PAIN SCALES - GENERAL: PAINLEVEL: 0-NO PAIN

## 2024-02-01 ASSESSMENT — ENCOUNTER SYMPTOMS
HEMATURIA: 0
NUMBNESS: 0
ABDOMINAL PAIN: 0
CHEST TIGHTNESS: 0
UNEXPECTED WEIGHT CHANGE: 0
HEADACHES: 0
DIZZINESS: 0
BLOOD IN STOOL: 0
COUGH: 0
DIARRHEA: 0
LIGHT-HEADEDNESS: 0
DYSURIA: 0
CHILLS: 0
SHORTNESS OF BREATH: 0
NAUSEA: 0
FEVER: 0
VOMITING: 0
CONSTIPATION: 0

## 2024-02-08 ENCOUNTER — LAB (OUTPATIENT)
Dept: LAB | Facility: CLINIC | Age: 69
End: 2024-02-08
Payer: MEDICARE

## 2024-02-08 DIAGNOSIS — D45 POLYCYTHEMIA VERA (MULTI): ICD-10-CM

## 2024-02-08 LAB
ALBUMIN SERPL BCP-MCNC: 4.4 G/DL (ref 3.4–5)
ALP SERPL-CCNC: 52 U/L (ref 33–136)
ALT SERPL W P-5'-P-CCNC: 8 U/L (ref 10–52)
ANION GAP SERPL CALC-SCNC: 11 MMOL/L (ref 10–20)
AST SERPL W P-5'-P-CCNC: 16 U/L (ref 9–39)
BASOPHILS # BLD AUTO: 0.03 X10*3/UL (ref 0–0.1)
BASOPHILS NFR BLD AUTO: 1 %
BILIRUB SERPL-MCNC: 1.6 MG/DL (ref 0–1.2)
BUN SERPL-MCNC: 15 MG/DL (ref 6–23)
CALCIUM SERPL-MCNC: 9.5 MG/DL (ref 8.6–10.3)
CHLORIDE SERPL-SCNC: 105 MMOL/L (ref 98–107)
CO2 SERPL-SCNC: 25 MMOL/L (ref 21–32)
CREAT SERPL-MCNC: 0.86 MG/DL (ref 0.5–1.3)
EGFRCR SERPLBLD CKD-EPI 2021: >90 ML/MIN/1.73M*2
EOSINOPHIL # BLD AUTO: 0.04 X10*3/UL (ref 0–0.7)
EOSINOPHIL NFR BLD AUTO: 1.3 %
ERYTHROCYTE [DISTWIDTH] IN BLOOD BY AUTOMATED COUNT: ABNORMAL %
GIANT PLATELETS BLD QL SMEAR: NORMAL
GLUCOSE SERPL-MCNC: 102 MG/DL (ref 74–99)
HCT VFR BLD AUTO: 29.4 % (ref 41–52)
HGB BLD-MCNC: 9.8 G/DL (ref 13.5–17.5)
HOLD SPECIMEN: NORMAL
IMM GRANULOCYTES # BLD AUTO: 0.01 X10*3/UL (ref 0–0.7)
IMM GRANULOCYTES NFR BLD AUTO: 0.3 % (ref 0–0.9)
LYMPHOCYTES # BLD AUTO: 1.44 X10*3/UL (ref 1.2–4.8)
LYMPHOCYTES NFR BLD AUTO: 47.4 %
MCH RBC QN AUTO: 40.5 PG (ref 26–34)
MCHC RBC AUTO-ENTMCNC: 33.3 G/DL (ref 32–36)
MCV RBC AUTO: 122 FL (ref 80–100)
MONOCYTES # BLD AUTO: 0.27 X10*3/UL (ref 0.1–1)
MONOCYTES NFR BLD AUTO: 8.9 %
NEUTROPHILS # BLD AUTO: 1.25 X10*3/UL (ref 1.2–7.7)
NEUTROPHILS NFR BLD AUTO: 41.1 %
NRBC BLD-RTO: ABNORMAL /100{WBCS}
OVALOCYTES BLD QL SMEAR: NORMAL
PLATELET # BLD AUTO: 230 X10*3/UL (ref 150–450)
POLYCHROMASIA BLD QL SMEAR: NORMAL
POTASSIUM SERPL-SCNC: 4.2 MMOL/L (ref 3.5–5.3)
PROT SERPL-MCNC: 7 G/DL (ref 6.4–8.2)
RBC # BLD AUTO: 2.42 X10*6/UL (ref 4.5–5.9)
RBC MORPH BLD: NORMAL
SODIUM SERPL-SCNC: 137 MMOL/L (ref 136–145)
STOMATOCYTES BLD QL SMEAR: NORMAL
WBC # BLD AUTO: 3 X10*3/UL (ref 4.4–11.3)

## 2024-02-08 PROCEDURE — 36415 COLL VENOUS BLD VENIPUNCTURE: CPT

## 2024-02-08 PROCEDURE — 85025 COMPLETE CBC W/AUTO DIFF WBC: CPT

## 2024-02-08 PROCEDURE — 80053 COMPREHEN METABOLIC PANEL: CPT

## 2024-02-29 ENCOUNTER — LAB (OUTPATIENT)
Dept: LAB | Facility: CLINIC | Age: 69
End: 2024-02-29
Payer: MEDICARE

## 2024-02-29 DIAGNOSIS — D45 POLYCYTHEMIA VERA (MULTI): Primary | ICD-10-CM

## 2024-02-29 DIAGNOSIS — D45 POLYCYTHEMIA VERA (MULTI): ICD-10-CM

## 2024-02-29 LAB
ALBUMIN SERPL BCP-MCNC: 4.3 G/DL (ref 3.4–5)
ALP SERPL-CCNC: 63 U/L (ref 33–136)
ALT SERPL W P-5'-P-CCNC: 12 U/L (ref 10–52)
ANION GAP SERPL CALC-SCNC: 9 MMOL/L (ref 10–20)
AST SERPL W P-5'-P-CCNC: 18 U/L (ref 9–39)
BASOPHILS # BLD AUTO: 0.14 X10*3/UL (ref 0–0.1)
BASOPHILS NFR BLD AUTO: 1.7 %
BILIRUB SERPL-MCNC: 0.6 MG/DL (ref 0–1.2)
BUN SERPL-MCNC: 11 MG/DL (ref 6–23)
CALCIUM SERPL-MCNC: 9.5 MG/DL (ref 8.6–10.3)
CHLORIDE SERPL-SCNC: 106 MMOL/L (ref 98–107)
CO2 SERPL-SCNC: 27 MMOL/L (ref 21–32)
CREAT SERPL-MCNC: 0.91 MG/DL (ref 0.5–1.3)
EGFRCR SERPLBLD CKD-EPI 2021: >90 ML/MIN/1.73M*2
EOSINOPHIL # BLD AUTO: 0.2 X10*3/UL (ref 0–0.7)
EOSINOPHIL NFR BLD AUTO: 2.4 %
ERYTHROCYTE [DISTWIDTH] IN BLOOD BY AUTOMATED COUNT: 21 % (ref 11.5–14.5)
GLUCOSE SERPL-MCNC: 95 MG/DL (ref 74–99)
HCT VFR BLD AUTO: 39.9 % (ref 41–52)
HGB BLD-MCNC: 12.8 G/DL (ref 13.5–17.5)
HOLD SPECIMEN: NORMAL
IMM GRANULOCYTES # BLD AUTO: 0.22 X10*3/UL (ref 0–0.7)
IMM GRANULOCYTES NFR BLD AUTO: 2.6 % (ref 0–0.9)
LYMPHOCYTES # BLD AUTO: 1.95 X10*3/UL (ref 1.2–4.8)
LYMPHOCYTES NFR BLD AUTO: 23.1 %
MCH RBC QN AUTO: 37.3 PG (ref 26–34)
MCHC RBC AUTO-ENTMCNC: 32.1 G/DL (ref 32–36)
MCV RBC AUTO: 116 FL (ref 80–100)
MONOCYTES # BLD AUTO: 0.62 X10*3/UL (ref 0.1–1)
MONOCYTES NFR BLD AUTO: 7.3 %
NEUTROPHILS # BLD AUTO: 5.32 X10*3/UL (ref 1.2–7.7)
NEUTROPHILS NFR BLD AUTO: 62.9 %
NRBC BLD-RTO: ABNORMAL /100{WBCS}
OVALOCYTES BLD QL SMEAR: NORMAL
PLATELET # BLD AUTO: 374 X10*3/UL (ref 150–450)
POLYCHROMASIA BLD QL SMEAR: NORMAL
POTASSIUM SERPL-SCNC: 4.3 MMOL/L (ref 3.5–5.3)
PROT SERPL-MCNC: 7.2 G/DL (ref 6.4–8.2)
RBC # BLD AUTO: 3.43 X10*6/UL (ref 4.5–5.9)
RBC MORPH BLD: NORMAL
SODIUM SERPL-SCNC: 138 MMOL/L (ref 136–145)
STOMATOCYTES BLD QL SMEAR: NORMAL
WBC # BLD AUTO: 8.5 X10*3/UL (ref 4.4–11.3)

## 2024-02-29 PROCEDURE — 80053 COMPREHEN METABOLIC PANEL: CPT

## 2024-02-29 PROCEDURE — 36415 COLL VENOUS BLD VENIPUNCTURE: CPT

## 2024-02-29 PROCEDURE — 85025 COMPLETE CBC W/AUTO DIFF WBC: CPT

## 2024-02-29 RX ORDER — CYPROHEPTADINE HYDROCHLORIDE 4 MG/1
4 TABLET ORAL 3 TIMES DAILY
Qty: 90 TABLET | Refills: 3 | Status: SHIPPED | OUTPATIENT
Start: 2024-02-29

## 2024-03-07 ENCOUNTER — TELEPHONE (OUTPATIENT)
Dept: ADMISSION | Facility: HOSPITAL | Age: 69
End: 2024-03-07
Payer: MEDICARE

## 2024-03-07 ENCOUNTER — OFFICE VISIT (OUTPATIENT)
Dept: HEMATOLOGY/ONCOLOGY | Facility: CLINIC | Age: 69
End: 2024-03-07
Payer: MEDICARE

## 2024-03-07 VITALS
RESPIRATION RATE: 18 BRPM | TEMPERATURE: 96.8 F | HEART RATE: 86 BPM | WEIGHT: 189.38 LBS | SYSTOLIC BLOOD PRESSURE: 111 MMHG | OXYGEN SATURATION: 94 % | BODY MASS INDEX: 29.38 KG/M2 | DIASTOLIC BLOOD PRESSURE: 77 MMHG

## 2024-03-07 DIAGNOSIS — D45 POLYCYTHEMIA VERA (MULTI): ICD-10-CM

## 2024-03-07 PROCEDURE — 1160F RVW MEDS BY RX/DR IN RCRD: CPT | Performed by: INTERNAL MEDICINE

## 2024-03-07 PROCEDURE — 99214 OFFICE O/P EST MOD 30 MIN: CPT | Performed by: INTERNAL MEDICINE

## 2024-03-07 PROCEDURE — 1159F MED LIST DOCD IN RCRD: CPT | Performed by: INTERNAL MEDICINE

## 2024-03-07 PROCEDURE — 1126F AMNT PAIN NOTED NONE PRSNT: CPT | Performed by: INTERNAL MEDICINE

## 2024-03-07 PROCEDURE — 1036F TOBACCO NON-USER: CPT | Performed by: INTERNAL MEDICINE

## 2024-03-07 RX ORDER — HYDROXYUREA 500 MG/1
500 CAPSULE ORAL DAILY
Qty: 90 CAPSULE | Refills: 3 | Status: SHIPPED | OUTPATIENT
Start: 2024-03-07 | End: 2025-03-07

## 2024-03-07 RX ORDER — HYDROXYUREA 500 MG/1
500 CAPSULE ORAL DAILY
Qty: 90 CAPSULE | Refills: 3 | Status: SHIPPED | OUTPATIENT
Start: 2024-03-07 | End: 2024-03-07 | Stop reason: SDUPTHER

## 2024-03-07 ASSESSMENT — PAIN SCALES - GENERAL: PAINLEVEL: 0-NO PAIN

## 2024-03-07 NOTE — TELEPHONE ENCOUNTER
[facility] pharmacy needs clarification regarding the Hydrea prescription.   Pharmacy is questioning if it is one tablet daily or two tablets daily.

## 2024-03-07 NOTE — ASSESSMENT & PLAN NOTE
"Recent hospitalization at [facility] with pancytopenia d/t hydrea toxicity. Transfused 4 units PRBC during admission.  \"Mild splenomegaly\" noted on US 1/30/24. Feeling much better.  Hydroxyrea was held    Polycythemia vera (some evidence  of clonal evolution with a new SF3B1 mutations)  Diagnostics:  - Bone marrow biopsy on 3/24/22 - no evidence of myelofibrosis progression   Treatment:  - Restart HU at just once a day   Disease Monitoring:  - Monthly CBCs while we re-titrate HU  Supportive Care/Toxicity Management  - Cyproheptadine 4mg 3x daily for itching  - continue prn diazepam for itching prevention with showers  - Gabapentin 600mg 3x daily   - Oral B12 1000mcg daily   Antimicrobial Prophylaxis:  - None at this time   IV access:  - Peripheral Access only   "

## 2024-03-12 ASSESSMENT — ENCOUNTER SYMPTOMS
LIGHT-HEADEDNESS: 0
NERVOUS/ANXIOUS: 0
ACTIVITY CHANGE: 0
ADENOPATHY: 0
DIARRHEA: 0
FLANK PAIN: 0
NUMBNESS: 0
COUGH: 0
DIAPHORESIS: 0
SINUS PRESSURE: 0
CONFUSION: 0
HEADACHES: 0
CHILLS: 0
BACK PAIN: 0
APPETITE CHANGE: 0
WEAKNESS: 0
UNEXPECTED WEIGHT CHANGE: 0
NAUSEA: 0
SINUS PAIN: 0
DIFFICULTY URINATING: 0
CONSTIPATION: 0
BRUISES/BLEEDS EASILY: 0
DYSPHORIC MOOD: 0
JOINT SWELLING: 0
VOMITING: 0
SHORTNESS OF BREATH: 0
FATIGUE: 0
RHINORRHEA: 0
FEVER: 0
SORE THROAT: 0
ABDOMINAL PAIN: 0

## 2024-03-12 NOTE — PROGRESS NOTES
"Patient ID: Mark Maria is a 68 y.o. male.  Referring Physician: Jeanie To, APRN-CNP  [address] [place] [address]  Primary Care Provider: Saeid Allen MD    Date of Service:  3/7/2024    ASSESSMENT and PLAN:      Problem List Items Addressed This Visit       Polycythemia vera (CMS/HCC)    Current Assessment & Plan     Recent hospitalization at [facility] with pancytopenia d/t hydrea toxicity. Transfused 4 units PRBC during admission.  \"Mild splenomegaly\" noted on US 1/30/24. Feeling much better.  Hydroxyrea was held    Polycythemia vera (some evidence  of clonal evolution with a new SF3B1 mutations)  Diagnostics:  - Bone marrow biopsy on 3/24/22 - no evidence of myelofibrosis progression   Treatment:  - Restart HU at just once a day   Disease Monitoring:  - Monthly CBCs while we re-titrate HU  Supportive Care/Toxicity Management  - Cyproheptadine 4mg 3x daily for itching  - continue prn diazepam for itching prevention with showers  - Gabapentin 600mg 3x daily   - Oral B12 1000mcg daily   Antimicrobial Prophylaxis:  - None at this time   IV access:  - Peripheral Access only          Relevant Orders    Lactate Dehydrogenase                 Oncology History Overview Note   Polycythemia Vera:   diagnosis:  originally referred to hematology 7/2017 with elevated red cell mass, mild leukocytosis and elevated platelets.  He had itching and burning symptoms that were worsening with baths as wlel.  No history of thrombosis at the time of presentation.  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  Treatment:  1. hydroxyurea single agent   Well tolerated wtih reasonable control of disease through Fall 2021, when blood counts dropped consdierably, and patient proved exquisitely senstivie to hydroxyurea. Referred to Dr. Ortega with concern for hemolysis, but workup was not suggestive of  introvascular hemolysis. Noted elevated MCV, but did not e " low haptoglobin, and was suspicious for stem cell disorder.  peripheral blood testing demonstrated a new SF3B1 mutation in addition to the known JAK2 mutation.       bone marrow biopsy on 3/24/22 showed hypercellular bone marrow (50-60%) with trilineage hematopoiesis and atypical megakaryotytic hyperplasia consistent with a myeloid neoplasm , there was no evidence of myelofibrosis progression.  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  The clinical picture with  nausea counts and symptoms that may be analogous to B-symptoms, and so treatment as primary myelofibrosis was considered.   - restarted hydroxyurea, 500mg/day on 4/7/22  - Currently on hydroxyurea, 1000mg BID 4/20/23  -11/30/2023 - recurring anemia and borderline anemia - ended up still taking and admitted in 1/2024, but improved on holidng medication  -3/7/2024 - restarted at 500mg/day      Polycythemia vera (CMS/HCC)   11/29/2023 Initial Diagnosis    Polycythemia vera (CMS/HCC)              Subjective       History of Present Illness:  Doing much better after his admission for anemia - no major issues since going home; energy returned.  Overlal, feels well.         Reviewed and Past Medical History, Past Surgical History, Family History, and Social History:    Review of Systems   Constitutional:  Negative for activity change, appetite change, chills, diaphoresis, fatigue, fever and unexpected weight change.   HENT:  Negative for congestion, mouth sores, nosebleeds, rhinorrhea, sinus pressure, sinus pain and sore throat.    Eyes:  Negative for visual disturbance.   Respiratory:  Negative for cough and shortness of breath.    Cardiovascular:  Negative for chest pain and leg swelling.   Gastrointestinal:  Negative for abdominal pain, constipation, diarrhea, nausea and vomiting.   Genitourinary:  Negative for difficulty urinating  and flank pain.   Musculoskeletal:  Negative for back pain and joint swelling.   Skin:  Negative for rash.   Neurological:  Negative for weakness, light-headedness, numbness and headaches.   Hematological:  Negative for adenopathy. Does not bruise/bleed easily.   Psychiatric/Behavioral:  Negative for confusion and dysphoric mood. The patient is not nervous/anxious.        Home Medications and Adherence Reviewed with Patient.       Objective      VS:  /77 (BP Location: Right arm, Patient Position: Sitting, BP Cuff Size: Large adult)   Pulse 86   Temp 36 °C (96.8 °F) (Temporal)   Resp 18   Wt 85.9 kg (189 lb 6 oz)   SpO2 94%   BMI 29.38 kg/m²   BSA: 2.02 meters squared    Physical Exam  Constitutional:       Appearance: Normal appearance.   HENT:      Mouth/Throat:      Mouth: Mucous membranes are moist.   Eyes:      Conjunctiva/sclera: Conjunctivae normal.      Pupils: Pupils are equal, round, and reactive to light.   Cardiovascular:      Rate and Rhythm: Normal rate and regular rhythm.      Heart sounds: Normal heart sounds.   Pulmonary:      Effort: Pulmonary effort is normal.      Breath sounds: Normal breath sounds.   Abdominal:      General: Abdomen is flat.      Palpations: Abdomen is soft.   Musculoskeletal:         General: Normal range of motion.   Skin:     General: Skin is warm and dry.   Neurological:      General: No focal deficit present.      Mental Status: He is oriented to person, place, and time.   Psychiatric:         Mood and Affect: Mood normal.         Behavior: Behavior normal.         Laboratory:  Lab on 02/29/2024   Component Date Value Ref Range Status    Glucose 02/29/2024 95  74 - 99 mg/dL Final    Sodium 02/29/2024 138  136 - 145 mmol/L Final    Potassium 02/29/2024 4.3  3.5 - 5.3 mmol/L Final    Chloride 02/29/2024 106  98 - 107 mmol/L Final    Bicarbonate 02/29/2024 27  21 - 32 mmol/L Final    Anion Gap 02/29/2024 9 (L)  10 - 20 mmol/L Final    Urea Nitrogen 02/29/2024 11  6  - 23 mg/dL Final    Creatinine 02/29/2024 0.91  0.50 - 1.30 mg/dL Final    eGFR 02/29/2024 >90  >60 mL/min/1.73m*2 Final    Calcium 02/29/2024 9.5  8.6 - 10.3 mg/dL Final    Albumin 02/29/2024 4.3  3.4 - 5.0 g/dL Final    Alkaline Phosphatase 02/29/2024 63  33 - 136 U/L Final    Total Protein 02/29/2024 7.2  6.4 - 8.2 g/dL Final    AST 02/29/2024 18  9 - 39 U/L Final    Bilirubin, Total 02/29/2024 0.6  0.0 - 1.2 mg/dL Final    ALT 02/29/2024 12  10 - 52 U/L Final    WBC 02/29/2024 8.5  4.4 - 11.3 x10*3/uL Final    nRBC 02/29/2024    Final    RBC 02/29/2024 3.43 (L)  4.50 - 5.90 x10*6/uL Final    Hemoglobin 02/29/2024 12.8 (L)  13.5 - 17.5 g/dL Final    Hematocrit 02/29/2024 39.9 (L)  41.0 - 52.0 % Final    MCV 02/29/2024 116 (H)  80 - 100 fL Final    MCH 02/29/2024 37.3 (H)  26.0 - 34.0 pg Final    MCHC 02/29/2024 32.1  32.0 - 36.0 g/dL Final    RDW 02/29/2024 21.0 (H)  11.5 - 14.5 % Final    Platelets 02/29/2024 374  150 - 450 x10*3/uL Final    Neutrophils % 02/29/2024 62.9  40.0 - 80.0 % Final    Immature Granulocytes %, Automated 02/29/2024 2.6 (H)  0.0 - 0.9 % Final    Lymphocytes % 02/29/2024 23.1  13.0 - 44.0 % Final    Monocytes % 02/29/2024 7.3  2.0 - 10.0 % Final    Eosinophils % 02/29/2024 2.4  0.0 - 6.0 % Final    Basophils % 02/29/2024 1.7  0.0 - 2.0 % Final    Neutrophils Absolute 02/29/2024 5.32  1.20 - 7.70 x10*3/uL Final    Immature Granulocytes Absolute, Au* 02/29/2024 0.22  0.00 - 0.70 x10*3/uL Final    Lymphocytes Absolute 02/29/2024 1.95  1.20 - 4.80 x10*3/uL Final    Monocytes Absolute 02/29/2024 0.62  0.10 - 1.00 x10*3/uL Final    Eosinophils Absolute 02/29/2024 0.20  0.00 - 0.70 x10*3/uL Final    Basophils Absolute 02/29/2024 0.14 (H)  0.00 - 0.10 x10*3/uL Final    Extra Tube 02/29/2024 Hold for add-ons.   Final    RBC Morphology 02/29/2024 See Below   Final    Polychromasia 02/29/2024 Mild   Final    Ovalocytes 02/29/2024 Few   Final    Stomatocytes 02/29/2024 Few   Final             Moi  ISAAC Andrade MD

## 2024-04-05 ENCOUNTER — LAB (OUTPATIENT)
Dept: LAB | Facility: CLINIC | Age: 69
End: 2024-04-05
Payer: MEDICARE

## 2024-04-05 DIAGNOSIS — D45 POLYCYTHEMIA VERA (MULTI): ICD-10-CM

## 2024-04-05 LAB
ALBUMIN SERPL BCP-MCNC: 4.3 G/DL (ref 3.4–5)
ALP SERPL-CCNC: 68 U/L (ref 33–136)
ALT SERPL W P-5'-P-CCNC: 11 U/L (ref 10–52)
ANION GAP SERPL CALC-SCNC: 11 MMOL/L (ref 10–20)
AST SERPL W P-5'-P-CCNC: 15 U/L (ref 9–39)
BASOPHILS # BLD AUTO: 0.11 X10*3/UL (ref 0–0.1)
BASOPHILS NFR BLD AUTO: 1.4 %
BILIRUB SERPL-MCNC: 0.7 MG/DL (ref 0–1.2)
BUN SERPL-MCNC: 17 MG/DL (ref 6–23)
CALCIUM SERPL-MCNC: 9.3 MG/DL (ref 8.6–10.3)
CHLORIDE SERPL-SCNC: 105 MMOL/L (ref 98–107)
CO2 SERPL-SCNC: 27 MMOL/L (ref 21–32)
CREAT SERPL-MCNC: 0.97 MG/DL (ref 0.5–1.3)
EGFRCR SERPLBLD CKD-EPI 2021: 85 ML/MIN/1.73M*2
EOSINOPHIL # BLD AUTO: 0.26 X10*3/UL (ref 0–0.7)
EOSINOPHIL NFR BLD AUTO: 3.2 %
ERYTHROCYTE [DISTWIDTH] IN BLOOD BY AUTOMATED COUNT: 20.1 % (ref 11.5–14.5)
GLUCOSE SERPL-MCNC: 100 MG/DL (ref 74–99)
HCT VFR BLD AUTO: 43.6 % (ref 41–52)
HGB BLD-MCNC: 14.2 G/DL (ref 13.5–17.5)
IMM GRANULOCYTES # BLD AUTO: 0.06 X10*3/UL (ref 0–0.7)
IMM GRANULOCYTES NFR BLD AUTO: 0.7 % (ref 0–0.9)
LDH SERPL L TO P-CCNC: 193 U/L (ref 84–246)
LYMPHOCYTES # BLD AUTO: 1.55 X10*3/UL (ref 1.2–4.8)
LYMPHOCYTES NFR BLD AUTO: 19.2 %
MCH RBC QN AUTO: 34.8 PG (ref 26–34)
MCHC RBC AUTO-ENTMCNC: 32.6 G/DL (ref 32–36)
MCV RBC AUTO: 107 FL (ref 80–100)
MONOCYTES # BLD AUTO: 0.46 X10*3/UL (ref 0.1–1)
MONOCYTES NFR BLD AUTO: 5.7 %
NEUTROPHILS # BLD AUTO: 5.62 X10*3/UL (ref 1.2–7.7)
NEUTROPHILS NFR BLD AUTO: 69.8 %
NRBC BLD-RTO: ABNORMAL /100{WBCS}
PLATELET # BLD AUTO: 392 X10*3/UL (ref 150–450)
POLYCHROMASIA BLD QL SMEAR: NORMAL
POTASSIUM SERPL-SCNC: 4.3 MMOL/L (ref 3.5–5.3)
PROT SERPL-MCNC: 7.3 G/DL (ref 6.4–8.2)
RBC # BLD AUTO: 4.08 X10*6/UL (ref 4.5–5.9)
RBC MORPH BLD: NORMAL
SODIUM SERPL-SCNC: 139 MMOL/L (ref 136–145)
STOMATOCYTES BLD QL SMEAR: NORMAL
WBC # BLD AUTO: 8.1 X10*3/UL (ref 4.4–11.3)

## 2024-04-05 PROCEDURE — 85025 COMPLETE CBC W/AUTO DIFF WBC: CPT

## 2024-04-05 PROCEDURE — 80053 COMPREHEN METABOLIC PANEL: CPT

## 2024-04-05 PROCEDURE — 36415 COLL VENOUS BLD VENIPUNCTURE: CPT

## 2024-04-05 PROCEDURE — 83615 LACTATE (LD) (LDH) ENZYME: CPT

## 2024-04-17 ENCOUNTER — TELEPHONE (OUTPATIENT)
Dept: HEMATOLOGY/ONCOLOGY | Facility: HOSPITAL | Age: 69
End: 2024-04-17
Payer: MEDICARE

## 2024-04-17 NOTE — TELEPHONE ENCOUNTER
The patient calls to ask if he is able to get a tetanus shot or will it interfere with medication he is on?    Tetanus shot is due.  He has not had an injjury.

## 2024-04-17 NOTE — TELEPHONE ENCOUNTER
Per Jeanie To NP it is ok for patient to get his tetanus shot.    Called patient who states understanding via teachback.

## 2024-05-06 NOTE — PROGRESS NOTES
Patient ID: Mark Maria is a 68 y.o. male.  Referring Physician: Jeanie To, APRN-CNP  [address] [place] [address]  Primary Care Provider: Saeid Allen MD    Date of Service:  5/9/2024    ASSESSMENT and PLAN:    Polycythemia vera (Multi)  Polycythemia vera (some evidence  of clonal evolution with a new SF3B1 mutations)  Diagnostics:  - Bone marrow biopsy on 3/24/22 - no evidence of myelofibrosis progression   Treatment:  - Continue  mg daily   Disease Monitoring:  - Q 2 month CBC  Supportive Care/Toxicity Management  - Cyproheptadine 4mg 3x daily for itching  - continue prn diazepam for itching prevention with showers  - Gabapentin 600mg 3x daily   - Oral B12 1000mcg daily   Antimicrobial Prophylaxis:  - None at this time   IV access:  - Peripheral Access only      Oncology History Overview Note   Polycythemia Vera:   diagnosis:  originally referred to hematology 7/2017 with elevated red cell mass, mild leukocytosis and elevated platelets.  He had itching and burning symptoms that were worsening with baths as wlel.  No history of thrombosis at the time of presentation.  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  Treatment:  1. hydroxyurea single agent   Well tolerated wtih reasonable control of disease through Fall 2021, when blood counts dropped consdierably, and patient proved exquisitely senstivie to hydroxyurea. Referred to Dr. Ortega with concern for hemolysis, but workup was not suggestive of  introvascular hemolysis. Noted elevated MCV, but did not e low haptoglobin, and was suspicious for stem cell disorder.  peripheral blood testing demonstrated a new SF3B1 mutation in addition to the known JAK2 mutation.       bone marrow biopsy on 3/24/22 showed hypercellular bone marrow (50-60%) with trilineage hematopoiesis and atypical megakaryotytic hyperplasia consistent with a myeloid neoplasm , there was no evidence of myelofibrosis  progression.  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  The clinical picture with  nausea counts and symptoms that may be analogous to B-symptoms, and so treatment as primary myelofibrosis was considered.   - restarted hydroxyurea, 500mg/day on 4/7/22  - Currently on hydroxyurea, 1000mg BID 4/20/23  -11/30/2023 - recurring anemia and borderline anemia - ended up still taking and admitted in 1/2024, but improved on holidng medication  -3/7/2024 - restarted at 500mg/day      Polycythemia vera (Multi)   11/29/2023 Initial Diagnosis    Polycythemia vera (CMS/HCC)        SUBJECTIVE:  History of Present Illness:  Patient presents today, accompanied by his wife, for follow up.  He reports feeling ok. He has had a few times where he needed cyprohexidine after a shower.  Otherwise, no new changes ion his health.        Review of Systems   Constitutional:  Negative for chills, fever and unexpected weight change.   HENT:  Negative for mouth sores and nosebleeds.    Respiratory:  Negative for cough, chest tightness and shortness of breath.    Cardiovascular:  Negative for chest pain and leg swelling.   Gastrointestinal:  Negative for abdominal pain, blood in stool, constipation, diarrhea, nausea and vomiting.   Genitourinary:  Negative for dysuria and hematuria.   Skin:  Negative for rash.   Neurological:  Negative for dizziness, light-headedness, numbness and headaches.     OBJECTIVE:  KPS: Karnofsky Score: 100 - Fully active, able to carry on all pre-disease performed without restriction     Physical Exam  Constitutional:       Appearance: Normal appearance.   HENT:      Head: Normocephalic.   Eyes:      Pupils: Pupils are equal, round, and reactive to light.   Cardiovascular:      Rate and Rhythm: Normal rate and regular rhythm.   Pulmonary:      Effort: Pulmonary effort is normal.      Breath  sounds: Normal breath sounds.   Abdominal:      General: Bowel sounds are normal.      Palpations: Abdomen is soft.   Musculoskeletal:         General: Normal range of motion.      Cervical back: Normal range of motion and neck supple.   Lymphadenopathy:      Comments: No lymphadenopathy   Skin:     General: Skin is warm and dry.      Findings: No lesion or rash.   Neurological:      General: No focal deficit present.      Mental Status: He is alert and oriented to person, place, and time. Mental status is at baseline.      Comments: No numbness or tingling   Psychiatric:         Mood and Affect: Mood normal.       Current Outpatient Medications   Medication Instructions    albuterol 2.5 mg /3 mL (0.083 %) nebulizer solution INHALE THE CONTENTS OF 1 VIAL (3 ML) VIA NEBULIZER TWICE A DAY    albuterol 90 mcg/actuation inhaler inhalation, Every 6 hours    chlorhexidine (Peridex) 0.12 % solution RINSE MOUTH WITH 5 TO 10 ML ONCE DAILY AFTER BRUSHING FOR 1 MINUTE AND SPIT OUT    cyproheptadine (PERIACTIN) 4 mg, oral, 3 times daily    diazePAM (Valium) 5 mg tablet TAKE ONE TABLET BY MOUTH TWO TIMES A DAY AS PREMED FOR SHOWERS AND TRIGGERS FOR NEURO REACTIVITY    diphenhydrAMINE (Sominex) 25 mg tablet oral    esomeprazole (NexIUM) 40 mg DR capsule 1 capsule, oral, Daily    famotidine (Pepcid) 40 mg tablet     gabapentin (NEURONTIN) 600 mg, oral, 3 times daily    hydroxyurea (HYDREA) 500 mg, oral, Daily    hydrOXYzine HCL (Atarax) 25 mg tablet 1 tablet, oral, 3 times daily PRN    Linzess 72 mcg capsule TAKE ONE CAPSULE BY MOUTH ONCE DAILY ON AN EMPTY STOMACH. SWALLOW WHOLE. DO NOT CRUSH OR CHEW    simvastatin (ZOCOR) 20 mg, oral, Nightly    sodium chloride 3 % nebulizer solution INHALE THE CONTENTS OF 1 VIAL (4 ML) VIA NEBULIZER TWICE DAILY      Laboratory:  The pertinent laboratory results were reviewed and discussed with the patient.    Lab Results   Component Value Date    WBC 7.6 05/09/2024    HCT 43.6 05/09/2024    HGB  14.7 05/09/2024     05/09/2024    K 4.1 05/09/2024    CALCIUM 9.9 05/09/2024     05/09/2024    MG 2.00 02/16/2023    BILITOT 0.9 05/09/2024    ALT 16 05/09/2024    AST 25 05/09/2024    BUN 15 05/09/2024    CREATININE 0.95 05/09/2024      Note: for a comprehensive list of the patient's lab results, access the Results Review activity.    RTC:  Q 2 month MD/SAMANTHA follow up    Jeanie To, APRN-CNP

## 2024-05-09 ENCOUNTER — LAB (OUTPATIENT)
Dept: LAB | Facility: CLINIC | Age: 69
End: 2024-05-09
Payer: MEDICARE

## 2024-05-09 ENCOUNTER — OFFICE VISIT (OUTPATIENT)
Dept: HEMATOLOGY/ONCOLOGY | Facility: CLINIC | Age: 69
End: 2024-05-09
Payer: MEDICARE

## 2024-05-09 VITALS
HEIGHT: 66 IN | WEIGHT: 179.9 LBS | RESPIRATION RATE: 16 BRPM | HEART RATE: 89 BPM | OXYGEN SATURATION: 95 % | SYSTOLIC BLOOD PRESSURE: 119 MMHG | TEMPERATURE: 97.5 F | DIASTOLIC BLOOD PRESSURE: 84 MMHG | BODY MASS INDEX: 28.91 KG/M2

## 2024-05-09 DIAGNOSIS — D45 POLYCYTHEMIA VERA (MULTI): ICD-10-CM

## 2024-05-09 DIAGNOSIS — D45 PV (POLYCYTHEMIA VERA) (MULTI): ICD-10-CM

## 2024-05-09 LAB
ALBUMIN SERPL BCP-MCNC: 4.6 G/DL (ref 3.4–5)
ALP SERPL-CCNC: 65 U/L (ref 33–136)
ALT SERPL W P-5'-P-CCNC: 16 U/L (ref 10–52)
ANION GAP SERPL CALC-SCNC: 11 MMOL/L (ref 10–20)
AST SERPL W P-5'-P-CCNC: 25 U/L (ref 9–39)
BASOPHILS # BLD AUTO: 0.13 X10*3/UL (ref 0–0.1)
BASOPHILS NFR BLD AUTO: 1.7 %
BILIRUB SERPL-MCNC: 0.9 MG/DL (ref 0–1.2)
BUN SERPL-MCNC: 15 MG/DL (ref 6–23)
CALCIUM SERPL-MCNC: 9.9 MG/DL (ref 8.6–10.3)
CHLORIDE SERPL-SCNC: 105 MMOL/L (ref 98–107)
CO2 SERPL-SCNC: 28 MMOL/L (ref 21–32)
CREAT SERPL-MCNC: 0.95 MG/DL (ref 0.5–1.3)
EGFRCR SERPLBLD CKD-EPI 2021: 87 ML/MIN/1.73M*2
EOSINOPHIL # BLD AUTO: 0.24 X10*3/UL (ref 0–0.7)
EOSINOPHIL NFR BLD AUTO: 3.1 %
ERYTHROCYTE [DISTWIDTH] IN BLOOD BY AUTOMATED COUNT: 19 % (ref 11.5–14.5)
GLUCOSE SERPL-MCNC: 112 MG/DL (ref 74–99)
HCT VFR BLD AUTO: 43.6 % (ref 41–52)
HGB BLD-MCNC: 14.7 G/DL (ref 13.5–17.5)
IMM GRANULOCYTES # BLD AUTO: 0.09 X10*3/UL (ref 0–0.7)
IMM GRANULOCYTES NFR BLD AUTO: 1.2 % (ref 0–0.9)
LDH SERPL L TO P-CCNC: 251 U/L (ref 84–246)
LYMPHOCYTES # BLD AUTO: 1.27 X10*3/UL (ref 1.2–4.8)
LYMPHOCYTES NFR BLD AUTO: 16.6 %
MCH RBC QN AUTO: 36 PG (ref 26–34)
MCHC RBC AUTO-ENTMCNC: 33.7 G/DL (ref 32–36)
MCV RBC AUTO: 107 FL (ref 80–100)
MONOCYTES # BLD AUTO: 0.59 X10*3/UL (ref 0.1–1)
MONOCYTES NFR BLD AUTO: 7.7 %
NEUTROPHILS # BLD AUTO: 5.32 X10*3/UL (ref 1.2–7.7)
NEUTROPHILS NFR BLD AUTO: 69.7 %
PLATELET # BLD AUTO: 411 X10*3/UL (ref 150–450)
POTASSIUM SERPL-SCNC: 4.1 MMOL/L (ref 3.5–5.3)
PROT SERPL-MCNC: 7.4 G/DL (ref 6.4–8.2)
RBC # BLD AUTO: 4.08 X10*6/UL (ref 4.5–5.9)
SODIUM SERPL-SCNC: 140 MMOL/L (ref 136–145)
WBC # BLD AUTO: 7.6 X10*3/UL (ref 4.4–11.3)

## 2024-05-09 PROCEDURE — 85025 COMPLETE CBC W/AUTO DIFF WBC: CPT

## 2024-05-09 PROCEDURE — 1159F MED LIST DOCD IN RCRD: CPT | Performed by: NURSE PRACTITIONER

## 2024-05-09 PROCEDURE — 36415 COLL VENOUS BLD VENIPUNCTURE: CPT

## 2024-05-09 PROCEDURE — 1126F AMNT PAIN NOTED NONE PRSNT: CPT | Performed by: NURSE PRACTITIONER

## 2024-05-09 PROCEDURE — 83615 LACTATE (LD) (LDH) ENZYME: CPT

## 2024-05-09 PROCEDURE — 99215 OFFICE O/P EST HI 40 MIN: CPT | Performed by: NURSE PRACTITIONER

## 2024-05-09 PROCEDURE — 1160F RVW MEDS BY RX/DR IN RCRD: CPT | Performed by: NURSE PRACTITIONER

## 2024-05-09 PROCEDURE — 84075 ASSAY ALKALINE PHOSPHATASE: CPT

## 2024-05-09 RX ORDER — GABAPENTIN 300 MG/1
600 CAPSULE ORAL 3 TIMES DAILY
Qty: 180 CAPSULE | Refills: 2 | Status: SHIPPED | OUTPATIENT
Start: 2024-05-09 | End: 2024-08-07

## 2024-05-09 ASSESSMENT — ENCOUNTER SYMPTOMS
DYSURIA: 0
SHORTNESS OF BREATH: 0
COUGH: 0
NAUSEA: 0
CHEST TIGHTNESS: 0
FEVER: 0
UNEXPECTED WEIGHT CHANGE: 0
CHILLS: 0
HEADACHES: 0
LIGHT-HEADEDNESS: 0
DIZZINESS: 0
DIARRHEA: 0
NUMBNESS: 0
CONSTIPATION: 0
HEMATURIA: 0
ABDOMINAL PAIN: 0
VOMITING: 0
BLOOD IN STOOL: 0

## 2024-05-09 ASSESSMENT — PAIN SCALES - GENERAL: PAINLEVEL: 0-NO PAIN

## 2024-05-09 NOTE — ASSESSMENT & PLAN NOTE
Polycythemia vera (some evidence  of clonal evolution with a new SF3B1 mutations)  Diagnostics:  - Bone marrow biopsy on 3/24/22 - no evidence of myelofibrosis progression   Treatment:  - Continue  mg daily   Disease Monitoring:  - Q 2 month CBC  Supportive Care/Toxicity Management  - Cyproheptadine 4mg 3x daily for itching  - continue prn diazepam for itching prevention with showers  - Gabapentin 600mg 3x daily   - Oral B12 1000mcg daily   Antimicrobial Prophylaxis:  - None at this time   IV access:  - Peripheral Access only

## 2024-07-11 ENCOUNTER — OFFICE VISIT (OUTPATIENT)
Dept: HEMATOLOGY/ONCOLOGY | Facility: CLINIC | Age: 69
End: 2024-07-11
Payer: MEDICARE

## 2024-07-11 ENCOUNTER — INFUSION (OUTPATIENT)
Dept: HEMATOLOGY/ONCOLOGY | Facility: CLINIC | Age: 69
End: 2024-07-11
Payer: MEDICARE

## 2024-07-11 ENCOUNTER — LAB (OUTPATIENT)
Dept: LAB | Facility: CLINIC | Age: 69
End: 2024-07-11
Payer: MEDICARE

## 2024-07-11 VITALS
DIASTOLIC BLOOD PRESSURE: 69 MMHG | SYSTOLIC BLOOD PRESSURE: 99 MMHG | HEART RATE: 73 BPM | OXYGEN SATURATION: 96 % | TEMPERATURE: 97.2 F | RESPIRATION RATE: 16 BRPM

## 2024-07-11 VITALS
DIASTOLIC BLOOD PRESSURE: 70 MMHG | RESPIRATION RATE: 16 BRPM | WEIGHT: 183.42 LBS | TEMPERATURE: 97.2 F | SYSTOLIC BLOOD PRESSURE: 107 MMHG | BODY MASS INDEX: 29.83 KG/M2 | HEART RATE: 82 BPM | OXYGEN SATURATION: 95 %

## 2024-07-11 DIAGNOSIS — D45 POLYCYTHEMIA VERA (MULTI): ICD-10-CM

## 2024-07-11 DIAGNOSIS — D45 PV (POLYCYTHEMIA VERA) (MULTI): Primary | ICD-10-CM

## 2024-07-11 DIAGNOSIS — D45 PV (POLYCYTHEMIA VERA) (MULTI): ICD-10-CM

## 2024-07-11 DIAGNOSIS — D45 POLYCYTHEMIA VERA (MULTI): Primary | ICD-10-CM

## 2024-07-11 LAB
ALBUMIN SERPL BCP-MCNC: 4.2 G/DL (ref 3.4–5)
ALP SERPL-CCNC: 59 U/L (ref 33–136)
ALT SERPL W P-5'-P-CCNC: 10 U/L (ref 10–52)
ANION GAP SERPL CALC-SCNC: 11 MMOL/L (ref 10–20)
AST SERPL W P-5'-P-CCNC: 18 U/L (ref 9–39)
BASOPHILS # BLD AUTO: 0.22 X10*3/UL (ref 0–0.1)
BASOPHILS NFR BLD AUTO: 2.5 %
BILIRUB SERPL-MCNC: 0.7 MG/DL (ref 0–1.2)
BUN SERPL-MCNC: 13 MG/DL (ref 6–23)
CALCIUM SERPL-MCNC: 9.3 MG/DL (ref 8.6–10.3)
CHLORIDE SERPL-SCNC: 106 MMOL/L (ref 98–107)
CO2 SERPL-SCNC: 25 MMOL/L (ref 21–32)
CREAT SERPL-MCNC: 0.97 MG/DL (ref 0.5–1.3)
EGFRCR SERPLBLD CKD-EPI 2021: 85 ML/MIN/1.73M*2
EOSINOPHIL # BLD AUTO: 0.29 X10*3/UL (ref 0–0.7)
EOSINOPHIL NFR BLD AUTO: 3.3 %
ERYTHROCYTE [DISTWIDTH] IN BLOOD BY AUTOMATED COUNT: 14.1 % (ref 11.5–14.5)
GLUCOSE SERPL-MCNC: 144 MG/DL (ref 74–99)
HCT VFR BLD AUTO: 46.1 % (ref 41–52)
HGB BLD-MCNC: 15 G/DL (ref 13.5–17.5)
IMM GRANULOCYTES # BLD AUTO: 0.11 X10*3/UL (ref 0–0.7)
IMM GRANULOCYTES NFR BLD AUTO: 1.2 % (ref 0–0.9)
LDH SERPL L TO P-CCNC: 198 U/L (ref 84–246)
LYMPHOCYTES # BLD AUTO: 1.42 X10*3/UL (ref 1.2–4.8)
LYMPHOCYTES NFR BLD AUTO: 16 %
MCH RBC QN AUTO: 35.7 PG (ref 26–34)
MCHC RBC AUTO-ENTMCNC: 32.5 G/DL (ref 32–36)
MCV RBC AUTO: 110 FL (ref 80–100)
MONOCYTES # BLD AUTO: 0.48 X10*3/UL (ref 0.1–1)
MONOCYTES NFR BLD AUTO: 5.4 %
NEUTROPHILS # BLD AUTO: 6.38 X10*3/UL (ref 1.2–7.7)
NEUTROPHILS NFR BLD AUTO: 71.6 %
PLATELET # BLD AUTO: 411 X10*3/UL (ref 150–450)
POTASSIUM SERPL-SCNC: 3.9 MMOL/L (ref 3.5–5.3)
PROT SERPL-MCNC: 6.9 G/DL (ref 6.4–8.2)
RBC # BLD AUTO: 4.2 X10*6/UL (ref 4.5–5.9)
SODIUM SERPL-SCNC: 138 MMOL/L (ref 136–145)
WBC # BLD AUTO: 8.9 X10*3/UL (ref 4.4–11.3)

## 2024-07-11 PROCEDURE — 1160F RVW MEDS BY RX/DR IN RCRD: CPT | Performed by: INTERNAL MEDICINE

## 2024-07-11 PROCEDURE — 99214 OFFICE O/P EST MOD 30 MIN: CPT | Performed by: INTERNAL MEDICINE

## 2024-07-11 PROCEDURE — 1159F MED LIST DOCD IN RCRD: CPT | Performed by: INTERNAL MEDICINE

## 2024-07-11 PROCEDURE — 84075 ASSAY ALKALINE PHOSPHATASE: CPT

## 2024-07-11 PROCEDURE — 36415 COLL VENOUS BLD VENIPUNCTURE: CPT

## 2024-07-11 PROCEDURE — 1126F AMNT PAIN NOTED NONE PRSNT: CPT | Performed by: INTERNAL MEDICINE

## 2024-07-11 PROCEDURE — 85025 COMPLETE CBC W/AUTO DIFF WBC: CPT

## 2024-07-11 PROCEDURE — 99195 PHLEBOTOMY: CPT

## 2024-07-11 PROCEDURE — 83615 LACTATE (LD) (LDH) ENZYME: CPT

## 2024-07-11 RX ORDER — DIPHENHYDRAMINE HYDROCHLORIDE 50 MG/ML
50 INJECTION INTRAMUSCULAR; INTRAVENOUS AS NEEDED
Status: CANCELLED | OUTPATIENT
Start: 2024-07-11

## 2024-07-11 RX ORDER — EPINEPHRINE 0.3 MG/.3ML
0.3 INJECTION SUBCUTANEOUS EVERY 5 MIN PRN
Status: DISCONTINUED | OUTPATIENT
Start: 2024-07-11 | End: 2024-07-11 | Stop reason: HOSPADM

## 2024-07-11 RX ORDER — FAMOTIDINE 10 MG/ML
20 INJECTION INTRAVENOUS ONCE AS NEEDED
OUTPATIENT
Start: 2024-07-11

## 2024-07-11 RX ORDER — FAMOTIDINE 10 MG/ML
20 INJECTION INTRAVENOUS ONCE AS NEEDED
Status: CANCELLED | OUTPATIENT
Start: 2024-07-11

## 2024-07-11 RX ORDER — HYDROXYUREA 500 MG/1
CAPSULE ORAL
Qty: 132 CAPSULE | Refills: 3 | Status: SHIPPED | OUTPATIENT
Start: 2024-07-11

## 2024-07-11 RX ORDER — HEPARIN SODIUM,PORCINE/PF 10 UNIT/ML
50 SYRINGE (ML) INTRAVENOUS AS NEEDED
Status: CANCELLED | OUTPATIENT
Start: 2024-07-11

## 2024-07-11 RX ORDER — HEPARIN 100 UNIT/ML
500 SYRINGE INTRAVENOUS AS NEEDED
OUTPATIENT
Start: 2024-07-11

## 2024-07-11 RX ORDER — ALBUTEROL SULFATE 0.83 MG/ML
3 SOLUTION RESPIRATORY (INHALATION) AS NEEDED
OUTPATIENT
Start: 2024-07-11

## 2024-07-11 RX ORDER — ALBUTEROL SULFATE 0.83 MG/ML
3 SOLUTION RESPIRATORY (INHALATION) AS NEEDED
Status: CANCELLED | OUTPATIENT
Start: 2024-07-11

## 2024-07-11 RX ORDER — DIPHENHYDRAMINE HYDROCHLORIDE 50 MG/ML
50 INJECTION INTRAMUSCULAR; INTRAVENOUS AS NEEDED
Status: DISCONTINUED | OUTPATIENT
Start: 2024-07-11 | End: 2024-07-11 | Stop reason: HOSPADM

## 2024-07-11 RX ORDER — HEPARIN 100 UNIT/ML
500 SYRINGE INTRAVENOUS AS NEEDED
Status: CANCELLED | OUTPATIENT
Start: 2024-07-11

## 2024-07-11 RX ORDER — FAMOTIDINE 10 MG/ML
20 INJECTION INTRAVENOUS ONCE AS NEEDED
Status: DISCONTINUED | OUTPATIENT
Start: 2024-07-11 | End: 2024-07-11 | Stop reason: HOSPADM

## 2024-07-11 RX ORDER — EPINEPHRINE 0.3 MG/.3ML
0.3 INJECTION SUBCUTANEOUS EVERY 5 MIN PRN
Status: CANCELLED | OUTPATIENT
Start: 2024-07-11

## 2024-07-11 RX ORDER — EPINEPHRINE 0.3 MG/.3ML
0.3 INJECTION SUBCUTANEOUS EVERY 5 MIN PRN
OUTPATIENT
Start: 2024-07-11

## 2024-07-11 RX ORDER — HEPARIN SODIUM,PORCINE/PF 10 UNIT/ML
50 SYRINGE (ML) INTRAVENOUS AS NEEDED
OUTPATIENT
Start: 2024-07-11

## 2024-07-11 RX ORDER — ALBUTEROL SULFATE 0.83 MG/ML
3 SOLUTION RESPIRATORY (INHALATION) AS NEEDED
Status: DISCONTINUED | OUTPATIENT
Start: 2024-07-11 | End: 2024-07-11 | Stop reason: HOSPADM

## 2024-07-11 RX ORDER — DIPHENHYDRAMINE HYDROCHLORIDE 50 MG/ML
50 INJECTION INTRAMUSCULAR; INTRAVENOUS AS NEEDED
OUTPATIENT
Start: 2024-07-11

## 2024-07-11 ASSESSMENT — ENCOUNTER SYMPTOMS
DIAPHORESIS: 0
SINUS PRESSURE: 0
CONSTIPATION: 0
WEAKNESS: 0
APPETITE CHANGE: 0
DYSPHORIC MOOD: 0
BRUISES/BLEEDS EASILY: 0
CONFUSION: 0
CHILLS: 0
NUMBNESS: 0
FATIGUE: 0
ACTIVITY CHANGE: 0
FEVER: 0
LIGHT-HEADEDNESS: 0
FLANK PAIN: 0
JOINT SWELLING: 0
NAUSEA: 0
DIFFICULTY URINATING: 0
NERVOUS/ANXIOUS: 0
UNEXPECTED WEIGHT CHANGE: 0
ADENOPATHY: 0
VOMITING: 0
ABDOMINAL PAIN: 0
SORE THROAT: 0
SHORTNESS OF BREATH: 0
BACK PAIN: 0
COUGH: 0
DIARRHEA: 0
HEADACHES: 0
RHINORRHEA: 0
SINUS PAIN: 0

## 2024-07-11 ASSESSMENT — PAIN SCALES - GENERAL: PAINLEVEL: 0-NO PAIN

## 2024-07-11 NOTE — PROGRESS NOTES
Patient ID: Mark Maria is a 68 y.o. male.  Referring Physician: Jeanie To, APRN-CNP  [id number] [place] [address]  Primary Care Provider: Saeid Allen MD    Date of Service:  7/11/2024    ASSESSMENT and PLAN:      Problem List Items Addressed This Visit       PV (polycythemia vera) (Multi) - Primary    Current Assessment & Plan     Polycythemia vera (some evidence  of clonal evolution with a new SF3B1 mutations)  7/11/2024: hematocrit 46% - just a little bit high - we discussed a one time therapeutic phlebotomy and then trying to slightly uptitrate his hydroxyurea. As he has a history of being senstive, we will only increase by 500mg on three days of the week.   Diagnostics:  - none pending at this time   Treatment:  - increase  mg daily - increase to twice a day on Monday/Wednesday/Friday, remain once a day on Sun/Tues/Thurs/Sat  Disease Monitoring:  - Q 2 month CBC  Supportive Care/Toxicity Management  - Cyproheptadine 4mg 3x daily for itching  - continue prn diazepam for itching prevention with showers  - Gabapentin 600mg 3x daily   - Oral B12 1000mcg daily   Antimicrobial Prophylaxis:  - None at this time   IV access:  - Peripheral Access only          Relevant Medications    hydroxyurea (Hydrea) 500 mg capsule                 Oncology History Overview Note   Polycythemia Vera:   diagnosis:  originally referred to hematology 7/2017 with elevated red cell mass, mild leukocytosis and elevated platelets.  He had itching and burning symptoms that were worsening with baths as wlel.  No history of thrombosis at the time of presentation.  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  Treatment:  1. hydroxyurea single agent   Well tolerated wtih reasonable control of disease through Fall 2021, when blood counts dropped consdierably, and patient proved exquisitely senstivie to hydroxyurea. Referred to Dr. Ortega with concern for hemolysis,  but workup was not suggestive of  introvascular hemolysis. Noted elevated MCV, but did not e low haptoglobin, and was suspicious for stem cell disorder.  peripheral blood testing demonstrated a new SF3B1 mutation in addition to the known JAK2 mutation.       bone marrow biopsy on 3/24/22 showed hypercellular bone marrow (50-60%) with trilineage hematopoiesis and atypical megakaryotytic hyperplasia consistent with a myeloid neoplasm , there was no evidence of myelofibrosis progression.  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  The clinical picture with  nausea counts and symptoms that may be analogous to B-symptoms, and so treatment as primary myelofibrosis was considered.   - restarted hydroxyurea, 500mg/day on 4/7/22  - Currently on hydroxyurea, 1000mg BID 4/20/23  -11/30/2023 - recurring anemia and borderline anemia - ended up still taking and admitted in 1/2024, but improved on holidng medication  -3/7/2024 - restarted at 500mg/day      PV (polycythemia vera) (Multi)   11/29/2023 Initial Diagnosis    Polycythemia vera (CMS/HCC)              Subjective       History of Present Illness:  Rare flare ups for the itching. No abdominal issues icnluding diarrhea, nausea, vomiting.         Reviewed and Past Medical History, Past Surgical History, Family History, and Social History:    Review of Systems   Constitutional:  Negative for activity change, appetite change, chills, diaphoresis, fatigue, fever and unexpected weight change.   HENT:  Negative for congestion, mouth sores, nosebleeds, rhinorrhea, sinus pressure, sinus pain and sore throat.    Eyes:  Negative for visual disturbance.   Respiratory:  Negative for cough and shortness of breath.    Cardiovascular:  Negative for chest pain and leg swelling.   Gastrointestinal:  Negative for abdominal pain, constipation, diarrhea, nausea and  vomiting.   Genitourinary:  Negative for difficulty urinating and flank pain.   Musculoskeletal:  Negative for back pain and joint swelling.   Skin:  Negative for rash.   Neurological:  Negative for weakness, light-headedness, numbness and headaches.   Hematological:  Negative for adenopathy. Does not bruise/bleed easily.   Psychiatric/Behavioral:  Negative for confusion and dysphoric mood. The patient is not nervous/anxious.        Home Medications and Adherence Reviewed with Patient.       Objective      VS:  /70 (BP Location: Right arm, Patient Position: Sitting, BP Cuff Size: Adult)   Pulse 82   Temp 36.2 °C (97.2 °F) (Temporal)   Resp 16   Wt 83.2 kg (183 lb 6.8 oz)   SpO2 95%   BMI 29.83 kg/m²   BSA: 1.96 meters squared    Physical Exam  Constitutional:       Appearance: Normal appearance.   HENT:      Mouth/Throat:      Mouth: Mucous membranes are moist.   Eyes:      Conjunctiva/sclera: Conjunctivae normal.      Pupils: Pupils are equal, round, and reactive to light.   Abdominal:      General: Abdomen is flat.      Palpations: Abdomen is soft.   Musculoskeletal:         General: Normal range of motion.   Skin:     General: Skin is warm and dry.   Neurological:      General: No focal deficit present.      Mental Status: He is oriented to person, place, and time.   Psychiatric:         Mood and Affect: Mood normal.         Behavior: Behavior normal.         Laboratory:  Lab on 07/11/2024   Component Date Value Ref Range Status    Glucose 07/11/2024 144 (H)  74 - 99 mg/dL Final    Sodium 07/11/2024 138  136 - 145 mmol/L Final    Potassium 07/11/2024 3.9  3.5 - 5.3 mmol/L Final    Chloride 07/11/2024 106  98 - 107 mmol/L Final    Bicarbonate 07/11/2024 25  21 - 32 mmol/L Final    Anion Gap 07/11/2024 11  10 - 20 mmol/L Final    Urea Nitrogen 07/11/2024 13  6 - 23 mg/dL Final    Creatinine 07/11/2024 0.97  0.50 - 1.30 mg/dL Final    eGFR 07/11/2024 85  >60 mL/min/1.73m*2 Final    Calcium 07/11/2024 9.3   8.6 - 10.3 mg/dL Final    Albumin 07/11/2024 4.2  3.4 - 5.0 g/dL Final    Alkaline Phosphatase 07/11/2024 59  33 - 136 U/L Final    Total Protein 07/11/2024 6.9  6.4 - 8.2 g/dL Final    AST 07/11/2024 18  9 - 39 U/L Final    Bilirubin, Total 07/11/2024 0.7  0.0 - 1.2 mg/dL Final    ALT 07/11/2024 10  10 - 52 U/L Final    WBC 07/11/2024 8.9  4.4 - 11.3 x10*3/uL Final    RBC 07/11/2024 4.20 (L)  4.50 - 5.90 x10*6/uL Final    Hemoglobin 07/11/2024 15.0  13.5 - 17.5 g/dL Final    Hematocrit 07/11/2024 46.1  41.0 - 52.0 % Final    MCV 07/11/2024 110 (H)  80 - 100 fL Final    MCH 07/11/2024 35.7 (H)  26.0 - 34.0 pg Final    MCHC 07/11/2024 32.5  32.0 - 36.0 g/dL Final    RDW 07/11/2024 14.1  11.5 - 14.5 % Final    Platelets 07/11/2024 411  150 - 450 x10*3/uL Final    Neutrophils % 07/11/2024 71.6  40.0 - 80.0 % Final    Immature Granulocytes %, Automated 07/11/2024 1.2 (H)  0.0 - 0.9 % Final    Lymphocytes % 07/11/2024 16.0  13.0 - 44.0 % Final    Monocytes % 07/11/2024 5.4  2.0 - 10.0 % Final    Eosinophils % 07/11/2024 3.3  0.0 - 6.0 % Final    Basophils % 07/11/2024 2.5  0.0 - 2.0 % Final    Neutrophils Absolute 07/11/2024 6.38  1.20 - 7.70 x10*3/uL Final    Immature Granulocytes Absolute, Au* 07/11/2024 0.11  0.00 - 0.70 x10*3/uL Final    Lymphocytes Absolute 07/11/2024 1.42  1.20 - 4.80 x10*3/uL Final    Monocytes Absolute 07/11/2024 0.48  0.10 - 1.00 x10*3/uL Final    Eosinophils Absolute 07/11/2024 0.29  0.00 - 0.70 x10*3/uL Final    Basophils Absolute 07/11/2024 0.22 (H)  0.00 - 0.10 x10*3/uL Final               Moi Andrade MD

## 2024-07-11 NOTE — ASSESSMENT & PLAN NOTE
Polycythemia vera (some evidence  of clonal evolution with a new SF3B1 mutations)  7/11/2024: hematocrit 46% - just a little bit high - we discussed a one time therapeutic phlebotomy and then trying to slightly uptitrate his hydroxyurea. As he has a history of being senstive, we will only increase by 500mg on three days of the week.   Diagnostics:  - none pending at this time   Treatment:  - increase  mg daily - increase to twice a day on Monday/Wednesday/Friday, remain once a day on Sun/Tues/Thurs/Sat  Disease Monitoring:  - Q 2 month CBC  Supportive Care/Toxicity Management  - Cyproheptadine 4mg 3x daily for itching  - continue prn diazepam for itching prevention with showers  - Gabapentin 600mg 3x daily   - Oral B12 1000mcg daily   Antimicrobial Prophylaxis:  - None at this time   IV access:  - Peripheral Access only

## 2024-07-11 NOTE — LETTER
2024      TO: MD Saeid Castellon MD  [address] [facility] [address]       Regarding:   Patient:  :  Visit Date: Mark Maria  1955  2024       Dear Dr. Saeid Allen MD     I saw our mutual patient Mark Maria for an interval visit in the malignant hematology clinic at [facility].  Please see below for my notes from this encounter. Please do not hesitate to call me if you have any questions. I look forward to continuing to follow your patient along with you.      Sincerely,    Moi Andrade MD         CC: Saeid Allen MD  [address] [facility] [address]  Via Fax: [phone]     Jeanie To, APRN-CNP  [address] [facility] [address]  Via In Basket    Saeid Allen MD  [address] [facility] [address]       Please see my documentation below:   Patient ID: Mark Maria is a 68 y.o. male.  Referring Physician: Jeanie To, APRN-CNP  [id number] [id number]  Primary Care Provider: Saeid Allen MD    Date of Service:  2024    ASSESSMENT and PLAN:      Problem List Items Addressed This Visit       PV (polycythemia vera) (Multi) - Primary    Current Assessment & Plan     Polycythemia vera (some evidence  of clonal evolution with a new SF3B1 mutations)  2024: hematocrit 46% - just a little bit high - we discussed a one time therapeutic phlebotomy and then trying to slightly uptitrate his hydroxyurea. As he has a history of being senstive, we will only increase by 500mg on three days of the week.   Diagnostics:  - none pending at this time   Treatment:  - increase  mg daily - increase to twice a day on Monday/Wednesday/Friday, remain once a day on Sun/Tues/Thurs/Sat  Disease  Monitoring:  - Q 2 month CBC  Supportive Care/Toxicity Management  - Cyproheptadine 4mg 3x daily for itching  - continue prn diazepam for itching prevention with showers  - Gabapentin 600mg 3x daily   - Oral B12 1000mcg daily   Antimicrobial Prophylaxis:  - None at this time   IV access:  - Peripheral Access only          Relevant Medications    hydroxyurea (Hydrea) 500 mg capsule                 Oncology History Overview Note   Polycythemia Vera:   diagnosis:  originally referred to hematology 7/2017 with elevated red cell mass, mild leukocytosis and elevated platelets.  He had itching and burning symptoms that were worsening with baths as wlel.  No history of thrombosis at the time of presentation.  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  Treatment:  1. hydroxyurea single agent   Well tolerated wtih reasonable control of disease through Fall 2021, when blood counts dropped consdierably, and patient proved exquisitely senstivie to hydroxyurea. Referred to Dr. Ortega with concern for hemolysis, but workup was not suggestive of  introvascular hemolysis. Noted elevated MCV, but did not e low haptoglobin, and was suspicious for stem cell disorder.  peripheral blood testing demonstrated a new SF3B1 mutation in addition to the known JAK2 mutation.       bone marrow biopsy on 3/24/22 showed hypercellular bone marrow (50-60%) with trilineage hematopoiesis and atypical megakaryotytic hyperplasia consistent with a myeloid neoplasm , there was no evidence of myelofibrosis progression.  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  The clinical picture with  nausea counts and symptoms that may be analogous to B-symptoms, and so treatment as primary myelofibrosis was considered.   - restarted hydroxyurea, 500mg/day on 4/7/22  - Currently on hydroxyurea,  1000mg BID 4/20/23  -11/30/2023 - recurring anemia and borderline anemia - ended up still taking and admitted in 1/2024, but improved on holidng medication  -3/7/2024 - restarted at 500mg/day      PV (polycythemia vera) (Multi)   11/29/2023 Initial Diagnosis    Polycythemia vera (CMS/HCC)              Subjective      History of Present Illness:  Rare flare ups for the itching. No abdominal issues icnluding diarrhea, nausea, vomiting.         Reviewed and Past Medical History, Past Surgical History, Family History, and Social History:    Review of Systems   Constitutional:  Negative for activity change, appetite change, chills, diaphoresis, fatigue, fever and unexpected weight change.   HENT:  Negative for congestion, mouth sores, nosebleeds, rhinorrhea, sinus pressure, sinus pain and sore throat.    Eyes:  Negative for visual disturbance.   Respiratory:  Negative for cough and shortness of breath.    Cardiovascular:  Negative for chest pain and leg swelling.   Gastrointestinal:  Negative for abdominal pain, constipation, diarrhea, nausea and vomiting.   Genitourinary:  Negative for difficulty urinating and flank pain.   Musculoskeletal:  Negative for back pain and joint swelling.   Skin:  Negative for rash.   Neurological:  Negative for weakness, light-headedness, numbness and headaches.   Hematological:  Negative for adenopathy. Does not bruise/bleed easily.   Psychiatric/Behavioral:  Negative for confusion and dysphoric mood. The patient is not nervous/anxious.        Home Medications and Adherence Reviewed with Patient.       Objective     VS:  /70 (BP Location: Right arm, Patient Position: Sitting, BP Cuff Size: Adult)   Pulse 82   Temp 36.2 °C (97.2 °F) (Temporal)   Resp 16   Wt 83.2 kg (183 lb 6.8 oz)   SpO2 95%   BMI 29.83 kg/m²   BSA: 1.96 meters squared    Physical Exam  Constitutional:       Appearance: Normal appearance.   HENT:      Mouth/Throat:      Mouth: Mucous membranes are moist.    Eyes:      Conjunctiva/sclera: Conjunctivae normal.      Pupils: Pupils are equal, round, and reactive to light.   Abdominal:      General: Abdomen is flat.      Palpations: Abdomen is soft.   Musculoskeletal:         General: Normal range of motion.   Skin:     General: Skin is warm and dry.   Neurological:      General: No focal deficit present.      Mental Status: He is oriented to person, place, and time.   Psychiatric:         Mood and Affect: Mood normal.         Behavior: Behavior normal.         Laboratory:  Lab on 07/11/2024   Component Date Value Ref Range Status    Glucose 07/11/2024 144 (H)  74 - 99 mg/dL Final    Sodium 07/11/2024 138  136 - 145 mmol/L Final    Potassium 07/11/2024 3.9  3.5 - 5.3 mmol/L Final    Chloride 07/11/2024 106  98 - 107 mmol/L Final    Bicarbonate 07/11/2024 25  21 - 32 mmol/L Final    Anion Gap 07/11/2024 11  10 - 20 mmol/L Final    Urea Nitrogen 07/11/2024 13  6 - 23 mg/dL Final    Creatinine 07/11/2024 0.97  0.50 - 1.30 mg/dL Final    eGFR 07/11/2024 85  >60 mL/min/1.73m*2 Final    Calcium 07/11/2024 9.3  8.6 - 10.3 mg/dL Final    Albumin 07/11/2024 4.2  3.4 - 5.0 g/dL Final    Alkaline Phosphatase 07/11/2024 59  33 - 136 U/L Final    Total Protein 07/11/2024 6.9  6.4 - 8.2 g/dL Final    AST 07/11/2024 18  9 - 39 U/L Final    Bilirubin, Total 07/11/2024 0.7  0.0 - 1.2 mg/dL Final    ALT 07/11/2024 10  10 - 52 U/L Final    WBC 07/11/2024 8.9  4.4 - 11.3 x10*3/uL Final    RBC 07/11/2024 4.20 (L)  4.50 - 5.90 x10*6/uL Final    Hemoglobin 07/11/2024 15.0  13.5 - 17.5 g/dL Final    Hematocrit 07/11/2024 46.1  41.0 - 52.0 % Final    MCV 07/11/2024 110 (H)  80 - 100 fL Final    MCH 07/11/2024 35.7 (H)  26.0 - 34.0 pg Final    MCHC 07/11/2024 32.5  32.0 - 36.0 g/dL Final    RDW 07/11/2024 14.1  11.5 - 14.5 % Final    Platelets 07/11/2024 411  150 - 450 x10*3/uL Final    Neutrophils % 07/11/2024 71.6  40.0 - 80.0 % Final    Immature Granulocytes %, Automated 07/11/2024 1.2 (H)  0.0  - 0.9 % Final    Lymphocytes % 07/11/2024 16.0  13.0 - 44.0 % Final    Monocytes % 07/11/2024 5.4  2.0 - 10.0 % Final    Eosinophils % 07/11/2024 3.3  0.0 - 6.0 % Final    Basophils % 07/11/2024 2.5  0.0 - 2.0 % Final    Neutrophils Absolute 07/11/2024 6.38  1.20 - 7.70 x10*3/uL Final    Immature Granulocytes Absolute, Au* 07/11/2024 0.11  0.00 - 0.70 x10*3/uL Final    Lymphocytes Absolute 07/11/2024 1.42  1.20 - 4.80 x10*3/uL Final    Monocytes Absolute 07/11/2024 0.48  0.10 - 1.00 x10*3/uL Final    Eosinophils Absolute 07/11/2024 0.29  0.00 - 0.70 x10*3/uL Final    Basophils Absolute 07/11/2024 0.22 (H)  0.00 - 0.10 x10*3/uL Final               Moi Andrade MD

## 2024-08-12 ENCOUNTER — TELEPHONE (OUTPATIENT)
Dept: HEMATOLOGY/ONCOLOGY | Facility: HOSPITAL | Age: 69
End: 2024-08-12
Payer: MEDICARE

## 2024-08-12 DIAGNOSIS — D45 PV (POLYCYTHEMIA VERA) (MULTI): ICD-10-CM

## 2024-08-12 RX ORDER — GABAPENTIN 300 MG/1
600 CAPSULE ORAL 3 TIMES DAILY
Qty: 180 CAPSULE | Refills: 2 | Status: SHIPPED | OUTPATIENT
Start: 2024-08-12 | End: 2024-11-10

## 2024-09-13 NOTE — PROGRESS NOTES
Patient ID: Mark Maria is a 68 y.o. male.  Referring Physician: Moi [place]rade [place]  [address] [place] [address]  Primary Care Provider: Saeid Allen MD    Date of Service:  9/19/2024    Assessment & Plan  PV (polycythemia vera) (Multi)  Polycythemia vera (some evidence  of clonal evolution with a new SF3B1 mutations)  Diagnostics:  - none pending at this time   Treatment:  - Continue  mg twice a day on Monday/Wednesday/Friday, remain once a day on Sun/Tues/Thurs/Sat  Disease Monitoring:  - Q 2 month CBC  Supportive Care/Toxicity Management  - Cyproheptadine 4mg 3x daily for itching  - continue prn diazepam for itching prevention with showers  - Gabapentin 600mg 3x daily   - Oral B12 1000mcg daily   Antimicrobial Prophylaxis:  - None at this time   IV access:  - Peripheral Access only     Oncology History Overview Note   Polycythemia Vera:   diagnosis:  originally referred to hematology 7/2017 with elevated red cell mass, mild leukocytosis and elevated platelets.  He had itching and burning symptoms that were worsening with baths as wlel.  No history of thrombosis at the time of presentation.  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  Treatment:  1. hydroxyurea single agent   Well tolerated wtih reasonable control of disease through Fall 2021, when blood counts dropped consdierably, and patient proved exquisitely senstivie to hydroxyurea. Referred to Dr. Ortega with concern for hemolysis, but workup was not suggestive of  introvascular hemolysis. Noted elevated MCV, but did not e low haptoglobin, and was suspicious for stem cell disorder.  peripheral blood testing demonstrated a new SF3B1 mutation in addition to the known JAK2 mutation.       bone marrow biopsy on 3/24/22 showed hypercellular bone marrow (50-60%) with trilineage hematopoiesis and atypical megakaryotytic hyperplasia consistent with a myeloid neoplasm , there was no  "evidence of myelofibrosis progression.  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  The clinical picture with  nausea counts and symptoms that may be analogous to B-symptoms, and so treatment as primary myelofibrosis was considered.   - restarted hydroxyurea, 500mg/day on 4/7/22  - Currently on hydroxyurea, 1000mg BID 4/20/23  -11/30/2023 - recurring anemia and borderline anemia - ended up still taking and admitted in 1/2024, but improved on holidng medication  -3/7/2024 - restarted at 500mg/day      PV (polycythemia vera) (Multi)   11/29/2023 Initial Diagnosis    Polycythemia vera (CMS/HCC)        SUBJECTIVE:  Patient presents today, unaccompanied, for follow up.  Reports feeling well.  His only complaint is occasional numbness in his hands and a \"prickly\" feeling in the soles of his feet.  He has not noticed any pattern of when this occurs, but its about 1 to 2 times per week.  This is not affecting his daily activities.       Review of Systems   Constitutional:  Negative for chills, fever and unexpected weight change.   HENT:  Negative for mouth sores and nosebleeds.    Respiratory:  Negative for cough, chest tightness and shortness of breath.    Cardiovascular:  Negative for chest pain and leg swelling.   Gastrointestinal:  Negative for abdominal pain, blood in stool, constipation, diarrhea, nausea and vomiting.   Genitourinary:  Negative for dysuria and hematuria.   Skin:  Negative for rash.   Neurological:  Positive for numbness. Negative for dizziness, light-headedness and headaches.     OBJECTIVE:  KPS: Karnofsky Score: 100 - Fully active, able to carry on all pre-disease performed without restriction     Physical Exam  Constitutional:       Appearance: Normal appearance.   HENT:      Head: Normocephalic.   Eyes:      Pupils: Pupils are equal, round, and reactive to light. "   Cardiovascular:      Rate and Rhythm: Normal rate and regular rhythm.   Pulmonary:      Effort: Pulmonary effort is normal.      Breath sounds: Normal breath sounds.   Abdominal:      General: Bowel sounds are normal.      Palpations: Abdomen is soft.   Musculoskeletal:         General: Normal range of motion.      Cervical back: Normal range of motion and neck supple.   Lymphadenopathy:      Comments: No lymphadenopathy   Skin:     General: Skin is warm and dry.      Findings: No lesion or rash.   Neurological:      General: No focal deficit present.      Mental Status: He is alert and oriented to person, place, and time. Mental status is at baseline.      Comments: No numbness or tingling   Psychiatric:         Mood and Affect: Mood normal.       Current Outpatient Medications   Medication Instructions    albuterol 2.5 mg /3 mL (0.083 %) nebulizer solution INHALE THE CONTENTS OF 1 VIAL (3 ML) VIA NEBULIZER TWICE A DAY    albuterol 90 mcg/actuation inhaler inhalation, Every 6 hours    chlorhexidine (Peridex) 0.12 % solution RINSE MOUTH WITH 5 TO 10 ML ONCE DAILY AFTER BRUSHING FOR 1 MINUTE AND SPIT OUT    cyproheptadine (PERIACTIN) 4 mg, oral, 3 times daily    diazePAM (Valium) 5 mg tablet TAKE ONE TABLET BY MOUTH TWO TIMES A DAY AS PREMED FOR SHOWERS AND TRIGGERS FOR NEURO REACTIVITY    diphenhydrAMINE (Sominex) 25 mg tablet oral    esomeprazole (NexIUM) 40 mg DR capsule 1 capsule, oral, Daily    famotidine (Pepcid) 40 mg tablet     gabapentin (NEURONTIN) 600 mg, oral, 3 times daily    hydroxyurea (Hydrea) 500 mg capsule Take 500mg once a day Sun/Tues/Thurs/Sat.  Take 500mg twice a day Mon/Wed/Fri    hydrOXYzine HCL (Atarax) 25 mg tablet 1 tablet, oral, 3 times daily PRN    Linzess 72 mcg capsule TAKE ONE CAPSULE BY MOUTH ONCE DAILY ON AN EMPTY STOMACH. SWALLOW WHOLE. DO NOT CRUSH OR CHEW    simvastatin (ZOCOR) 20 mg, oral, Nightly    sodium chloride 3 % nebulizer solution INHALE THE CONTENTS OF 1 VIAL (4 ML)  VIA NEBULIZER TWICE DAILY      Laboratory:  The pertinent laboratory results were reviewed and discussed with the patient.    Lab Results   Component Value Date    WBC 9.3 09/19/2024    HCT 45.4 09/19/2024    HGB 15.1 09/19/2024     09/19/2024    K 4.1 09/19/2024    CALCIUM 9.4 09/19/2024     09/19/2024    MG 2.00 02/16/2023    BILITOT 0.9 09/19/2024    ALT 10 09/19/2024    AST 17 09/19/2024    BUN 18 09/19/2024    CREATININE 0.90 09/19/2024      Note: for a comprehensive list of the patient's lab results, access the Results Review activity.    RTC:  Q 2 month MD/SAMANTHA follow up     Jeanie To, APRN-CNP

## 2024-09-13 NOTE — ASSESSMENT & PLAN NOTE
Polycythemia vera (some evidence  of clonal evolution with a new SF3B1 mutations)  Diagnostics:  - none pending at this time   Treatment:  - Continue  mg twice a day on Monday/Wednesday/Friday, remain once a day on Sun/Tues/Thurs/Sat  Disease Monitoring:  - Q 2 month CBC  Supportive Care/Toxicity Management  - Cyproheptadine 4mg 3x daily for itching  - continue prn diazepam for itching prevention with showers  - Gabapentin 600mg 3x daily   - Oral B12 1000mcg daily   Antimicrobial Prophylaxis:  - None at this time   IV access:  - Peripheral Access only

## 2024-09-19 ENCOUNTER — OFFICE VISIT (OUTPATIENT)
Dept: HEMATOLOGY/ONCOLOGY | Facility: CLINIC | Age: 69
End: 2024-09-19
Payer: MEDICARE

## 2024-09-19 ENCOUNTER — LAB (OUTPATIENT)
Dept: LAB | Facility: CLINIC | Age: 69
End: 2024-09-19
Payer: MEDICARE

## 2024-09-19 VITALS
BODY MASS INDEX: 30.01 KG/M2 | SYSTOLIC BLOOD PRESSURE: 121 MMHG | RESPIRATION RATE: 17 BRPM | WEIGHT: 184.53 LBS | OXYGEN SATURATION: 93 % | HEART RATE: 95 BPM | DIASTOLIC BLOOD PRESSURE: 75 MMHG | TEMPERATURE: 97.9 F

## 2024-09-19 DIAGNOSIS — D45 PV (POLYCYTHEMIA VERA) (MULTI): Primary | ICD-10-CM

## 2024-09-19 DIAGNOSIS — D45 POLYCYTHEMIA VERA: ICD-10-CM

## 2024-09-19 LAB
ALBUMIN SERPL BCP-MCNC: 4.3 G/DL (ref 3.4–5)
ALP SERPL-CCNC: 67 U/L (ref 33–136)
ALT SERPL W P-5'-P-CCNC: 10 U/L (ref 10–52)
ANION GAP SERPL CALC-SCNC: 11 MMOL/L (ref 10–20)
AST SERPL W P-5'-P-CCNC: 17 U/L (ref 9–39)
BASOPHILS # BLD AUTO: 0.26 X10*3/UL (ref 0–0.1)
BASOPHILS NFR BLD AUTO: 2.8 %
BILIRUB SERPL-MCNC: 0.9 MG/DL (ref 0–1.2)
BUN SERPL-MCNC: 18 MG/DL (ref 6–23)
CALCIUM SERPL-MCNC: 9.4 MG/DL (ref 8.6–10.3)
CHLORIDE SERPL-SCNC: 107 MMOL/L (ref 98–107)
CO2 SERPL-SCNC: 26 MMOL/L (ref 21–32)
CREAT SERPL-MCNC: 0.9 MG/DL (ref 0.5–1.3)
EGFRCR SERPLBLD CKD-EPI 2021: >90 ML/MIN/1.73M*2
EOSINOPHIL # BLD AUTO: 0.28 X10*3/UL (ref 0–0.7)
EOSINOPHIL NFR BLD AUTO: 3 %
ERYTHROCYTE [DISTWIDTH] IN BLOOD BY AUTOMATED COUNT: 15.2 % (ref 11.5–14.5)
GLUCOSE SERPL-MCNC: 93 MG/DL (ref 74–99)
HCT VFR BLD AUTO: 45.4 % (ref 41–52)
HGB BLD-MCNC: 15.1 G/DL (ref 13.5–17.5)
IMM GRANULOCYTES # BLD AUTO: 0.13 X10*3/UL (ref 0–0.7)
IMM GRANULOCYTES NFR BLD AUTO: 1.4 % (ref 0–0.9)
LDH SERPL L TO P-CCNC: 233 U/L (ref 84–246)
LYMPHOCYTES # BLD AUTO: 1.43 X10*3/UL (ref 1.2–4.8)
LYMPHOCYTES NFR BLD AUTO: 15.4 %
MCH RBC QN AUTO: 35.5 PG (ref 26–34)
MCHC RBC AUTO-ENTMCNC: 33.3 G/DL (ref 32–36)
MCV RBC AUTO: 107 FL (ref 80–100)
MONOCYTES # BLD AUTO: 0.62 X10*3/UL (ref 0.1–1)
MONOCYTES NFR BLD AUTO: 6.7 %
NEUTROPHILS # BLD AUTO: 6.59 X10*3/UL (ref 1.2–7.7)
NEUTROPHILS NFR BLD AUTO: 70.7 %
PLATELET # BLD AUTO: 391 X10*3/UL (ref 150–450)
POTASSIUM SERPL-SCNC: 4.1 MMOL/L (ref 3.5–5.3)
PROT SERPL-MCNC: 7.2 G/DL (ref 6.4–8.2)
RBC # BLD AUTO: 4.25 X10*6/UL (ref 4.5–5.9)
SODIUM SERPL-SCNC: 140 MMOL/L (ref 136–145)
WBC # BLD AUTO: 9.3 X10*3/UL (ref 4.4–11.3)

## 2024-09-19 PROCEDURE — 99214 OFFICE O/P EST MOD 30 MIN: CPT | Performed by: NURSE PRACTITIONER

## 2024-09-19 PROCEDURE — 85025 COMPLETE CBC W/AUTO DIFF WBC: CPT

## 2024-09-19 PROCEDURE — 36415 COLL VENOUS BLD VENIPUNCTURE: CPT

## 2024-09-19 PROCEDURE — 80053 COMPREHEN METABOLIC PANEL: CPT

## 2024-09-19 PROCEDURE — 1159F MED LIST DOCD IN RCRD: CPT | Performed by: NURSE PRACTITIONER

## 2024-09-19 PROCEDURE — 1126F AMNT PAIN NOTED NONE PRSNT: CPT | Performed by: NURSE PRACTITIONER

## 2024-09-19 PROCEDURE — 83615 LACTATE (LD) (LDH) ENZYME: CPT

## 2024-09-19 ASSESSMENT — ENCOUNTER SYMPTOMS
ABDOMINAL PAIN: 0
SHORTNESS OF BREATH: 0
HEADACHES: 0
DIARRHEA: 0
CHEST TIGHTNESS: 0
UNEXPECTED WEIGHT CHANGE: 0
DYSURIA: 0
HEMATURIA: 0
NUMBNESS: 1
FEVER: 0
VOMITING: 0
BLOOD IN STOOL: 0
DIZZINESS: 0
CHILLS: 0
NAUSEA: 0
COUGH: 0
CONSTIPATION: 0
LIGHT-HEADEDNESS: 0

## 2024-09-19 ASSESSMENT — PAIN SCALES - GENERAL: PAINLEVEL: 0-NO PAIN

## 2024-11-04 NOTE — PROGRESS NOTES
Patient ID: Mark Maria is a 69 y.o. male.  Referring Physician: Jeanie To, APRN-CNP  [address] [place] [id number]  Primary Care Provider: Saeid Allen MD    Date of Service:  11/7/2024    Assessment & Plan  PV (polycythemia vera) (Multi)  Polycythemia vera (some evidence  of clonal evolution with a new SF3B1 mutations)  Diagnostics:  - none pending at this time   Treatment:  - Continue  mg twice a day on Monday/Wednesday/Friday, remain once a day on Sun/Tues/Thurs/Sat  Disease Monitoring:  - Q 2 month CBC  Supportive Care/Toxicity Management  - Cyproheptadine 4mg 3x daily for itching  - continue prn diazepam for itching prevention with showers  - Gabapentin 600mg 3x daily   - Oral B12 1000mcg daily   Antimicrobial Prophylaxis:  - None at this time   IV access:  - Peripheral Access only     Oncology History Overview Note   Polycythemia Vera:   diagnosis:  originally referred to hematology 7/2017 with elevated red cell mass, mild leukocytosis and elevated platelets.  He had itching and burning symptoms that were worsening with baths as wlel.  No history of thrombosis at the time of presentation.  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  Treatment:  1. hydroxyurea single agent   Well tolerated wtih reasonable control of disease through Fall 2021, when blood counts dropped consdierably, and patient proved exquisitely senstivie to hydroxyurea. Referred to Dr. Ortega with concern for hemolysis, but workup was not suggestive of  introvascular hemolysis. Noted elevated MCV, but did not e low haptoglobin, and was suspicious for stem cell disorder.  peripheral blood testing demonstrated a new SF3B1 mutation in addition to the known JAK2 mutation.       bone marrow biopsy on 3/24/22 showed hypercellular bone marrow (50-60%) with trilineage hematopoiesis and atypical megakaryotytic hyperplasia consistent with a myeloid neoplasm , there was no  evidence of myelofibrosis progression.  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  The clinical picture with  nausea counts and symptoms that may be analogous to B-symptoms, and so treatment as primary myelofibrosis was considered.   - restarted hydroxyurea, 500mg/day on 4/7/22  - Currently on hydroxyurea, 1000mg BID 4/20/23  -11/30/2023 - recurring anemia and borderline anemia - ended up still taking and admitted in 1/2024, but improved on holidng medication  -3/7/2024 - restarted at 500mg/day      PV (polycythemia vera) (Multi)   11/29/2023 Initial Diagnosis    Polycythemia vera (CMS/HCC)        SUBJECTIVE:  Patient presents today, unaccompanied, for follow up.  Reports that he has been feeling well.  No concerns or complaints today.      Review of Systems   Constitutional:  Negative for chills, fever and unexpected weight change.   HENT:  Negative for mouth sores and nosebleeds.    Respiratory:  Negative for cough, chest tightness and shortness of breath.    Cardiovascular:  Negative for chest pain and leg swelling.   Gastrointestinal:  Negative for abdominal pain, blood in stool, constipation, diarrhea, nausea and vomiting.   Genitourinary:  Negative for dysuria and hematuria.   Skin:  Negative for rash.   Neurological:  Negative for dizziness, light-headedness, numbness and headaches.     OBJECTIVE:  KPS: Karnofsky Score: 100 - Fully active, able to carry on all pre-disease performed without restriction     Physical Exam  Constitutional:       Appearance: Normal appearance.   HENT:      Head: Normocephalic.   Eyes:      Pupils: Pupils are equal, round, and reactive to light.   Cardiovascular:      Rate and Rhythm: Normal rate and regular rhythm.   Pulmonary:      Effort: Pulmonary effort is normal.      Breath sounds: Normal breath sounds.   Abdominal:      General: Bowel sounds are  normal.      Palpations: Abdomen is soft.   Musculoskeletal:         General: Normal range of motion.      Cervical back: Normal range of motion and neck supple.   Lymphadenopathy:      Comments: No lymphadenopathy   Skin:     General: Skin is warm and dry.      Findings: No lesion or rash.   Neurological:      General: No focal deficit present.      Mental Status: He is alert and oriented to person, place, and time. Mental status is at baseline.      Comments: No numbness or tingling   Psychiatric:         Mood and Affect: Mood normal.       Current Outpatient Medications   Medication Instructions    albuterol 2.5 mg /3 mL (0.083 %) nebulizer solution INHALE THE CONTENTS OF 1 VIAL (3 ML) VIA NEBULIZER TWICE A DAY    cyproheptadine (PERIACTIN) 4 mg, oral, 3 times daily    diazePAM (Valium) 5 mg tablet TAKE ONE TABLET BY MOUTH TWO TIMES A DAY AS PREMED FOR SHOWERS AND TRIGGERS FOR NEURO REACTIVITY    famotidine (Pepcid) 40 mg tablet     gabapentin (NEURONTIN) 600 mg, oral, 3 times daily    hydroxyurea (Hydrea) 500 mg capsule Take 500mg once a day Sun/Tues/Thurs/Sat.  Take 500mg twice a day Mon/Wed/Fri    hydrOXYzine HCL (ATARAX) 25 mg, oral, 3 times daily PRN    Linzess 72 mcg capsule TAKE ONE CAPSULE BY MOUTH ONCE DAILY ON AN EMPTY STOMACH. SWALLOW WHOLE. DO NOT CRUSH OR CHEW    simvastatin (ZOCOR) 20 mg, Nightly      Laboratory:  The pertinent laboratory results were reviewed and discussed with the patient.    Lab Results   Component Value Date    WBC 8.5 11/07/2024    HCT 42.6 11/07/2024    HGB 14.3 11/07/2024     11/07/2024    K 4.1 09/19/2024    CALCIUM 9.4 09/19/2024     09/19/2024    MG 2.00 02/16/2023    BILITOT 0.9 09/19/2024    ALT 10 09/19/2024    AST 17 09/19/2024    BUN 18 09/19/2024    CREATININE 0.90 09/19/2024      Note: for a comprehensive list of the patient's lab results, access the Results Review activity.    RTC:  Q 2 month MD/SAMANTHA follow up     Jeanie To, JESICA-CNP

## 2024-11-05 DIAGNOSIS — D45 POLYCYTHEMIA VERA: ICD-10-CM

## 2024-11-07 ENCOUNTER — LAB (OUTPATIENT)
Dept: LAB | Facility: CLINIC | Age: 69
End: 2024-11-07
Payer: MEDICARE

## 2024-11-07 ENCOUNTER — OFFICE VISIT (OUTPATIENT)
Dept: HEMATOLOGY/ONCOLOGY | Facility: CLINIC | Age: 69
End: 2024-11-07
Payer: MEDICARE

## 2024-11-07 VITALS
BODY MASS INDEX: 30.84 KG/M2 | OXYGEN SATURATION: 94 % | DIASTOLIC BLOOD PRESSURE: 74 MMHG | SYSTOLIC BLOOD PRESSURE: 111 MMHG | TEMPERATURE: 97.3 F | RESPIRATION RATE: 16 BRPM | WEIGHT: 189.6 LBS | HEART RATE: 81 BPM

## 2024-11-07 DIAGNOSIS — D45 POLYCYTHEMIA VERA: ICD-10-CM

## 2024-11-07 DIAGNOSIS — D45 PV (POLYCYTHEMIA VERA) (MULTI): Primary | ICD-10-CM

## 2024-11-07 LAB
ALBUMIN SERPL BCP-MCNC: 4.1 G/DL (ref 3.4–5)
ALP SERPL-CCNC: 58 U/L (ref 33–136)
ALT SERPL W P-5'-P-CCNC: 12 U/L (ref 10–52)
ANION GAP SERPL CALC-SCNC: 11 MMOL/L (ref 10–20)
AST SERPL W P-5'-P-CCNC: 19 U/L (ref 9–39)
BASOPHILS # BLD AUTO: 0.2 X10*3/UL (ref 0–0.1)
BASOPHILS NFR BLD AUTO: 2.4 %
BILIRUB SERPL-MCNC: 0.8 MG/DL (ref 0–1.2)
BUN SERPL-MCNC: 14 MG/DL (ref 6–23)
CALCIUM SERPL-MCNC: 8.5 MG/DL (ref 8.6–10.3)
CHLORIDE SERPL-SCNC: 105 MMOL/L (ref 98–107)
CO2 SERPL-SCNC: 27 MMOL/L (ref 21–32)
CREAT SERPL-MCNC: 0.83 MG/DL (ref 0.5–1.3)
EGFRCR SERPLBLD CKD-EPI 2021: >90 ML/MIN/1.73M*2
EOSINOPHIL # BLD AUTO: 0.23 X10*3/UL (ref 0–0.7)
EOSINOPHIL NFR BLD AUTO: 2.7 %
ERYTHROCYTE [DISTWIDTH] IN BLOOD BY AUTOMATED COUNT: 15.5 % (ref 11.5–14.5)
GLUCOSE SERPL-MCNC: 140 MG/DL (ref 74–99)
HCT VFR BLD AUTO: 42.6 % (ref 41–52)
HGB BLD-MCNC: 14.3 G/DL (ref 13.5–17.5)
IMM GRANULOCYTES # BLD AUTO: 0.14 X10*3/UL (ref 0–0.7)
IMM GRANULOCYTES NFR BLD AUTO: 1.7 % (ref 0–0.9)
LDH SERPL L TO P-CCNC: 228 U/L (ref 84–246)
LYMPHOCYTES # BLD AUTO: 1.35 X10*3/UL (ref 1.2–4.8)
LYMPHOCYTES NFR BLD AUTO: 16 %
MCH RBC QN AUTO: 37.2 PG (ref 26–34)
MCHC RBC AUTO-ENTMCNC: 33.6 G/DL (ref 32–36)
MCV RBC AUTO: 111 FL (ref 80–100)
MONOCYTES # BLD AUTO: 0.53 X10*3/UL (ref 0.1–1)
MONOCYTES NFR BLD AUTO: 6.3 %
NEUTROPHILS # BLD AUTO: 6.01 X10*3/UL (ref 1.2–7.7)
NEUTROPHILS NFR BLD AUTO: 70.9 %
PLATELET # BLD AUTO: 387 X10*3/UL (ref 150–450)
POTASSIUM SERPL-SCNC: 3.8 MMOL/L (ref 3.5–5.3)
PROT SERPL-MCNC: 6.8 G/DL (ref 6.4–8.2)
RBC # BLD AUTO: 3.84 X10*6/UL (ref 4.5–5.9)
SODIUM SERPL-SCNC: 139 MMOL/L (ref 136–145)
WBC # BLD AUTO: 8.5 X10*3/UL (ref 4.4–11.3)

## 2024-11-07 PROCEDURE — 1160F RVW MEDS BY RX/DR IN RCRD: CPT | Performed by: NURSE PRACTITIONER

## 2024-11-07 PROCEDURE — 99417 PROLNG OP E/M EACH 15 MIN: CPT | Performed by: NURSE PRACTITIONER

## 2024-11-07 PROCEDURE — 1159F MED LIST DOCD IN RCRD: CPT | Performed by: NURSE PRACTITIONER

## 2024-11-07 PROCEDURE — 80053 COMPREHEN METABOLIC PANEL: CPT

## 2024-11-07 PROCEDURE — 1036F TOBACCO NON-USER: CPT | Performed by: NURSE PRACTITIONER

## 2024-11-07 PROCEDURE — 1126F AMNT PAIN NOTED NONE PRSNT: CPT | Performed by: NURSE PRACTITIONER

## 2024-11-07 PROCEDURE — 85025 COMPLETE CBC W/AUTO DIFF WBC: CPT

## 2024-11-07 PROCEDURE — 99214 OFFICE O/P EST MOD 30 MIN: CPT | Performed by: NURSE PRACTITIONER

## 2024-11-07 PROCEDURE — 83615 LACTATE (LD) (LDH) ENZYME: CPT

## 2024-11-07 PROCEDURE — 36415 COLL VENOUS BLD VENIPUNCTURE: CPT

## 2024-11-07 RX ORDER — CYPROHEPTADINE HYDROCHLORIDE 4 MG/1
4 TABLET ORAL 3 TIMES DAILY
Qty: 90 TABLET | Refills: 3 | Status: SHIPPED | OUTPATIENT
Start: 2024-11-07

## 2024-11-07 RX ORDER — HYDROXYUREA 500 MG/1
CAPSULE ORAL
Qty: 132 CAPSULE | Refills: 3 | Status: SHIPPED | OUTPATIENT
Start: 2024-11-07

## 2024-11-07 RX ORDER — GABAPENTIN 300 MG/1
600 CAPSULE ORAL 3 TIMES DAILY
Qty: 180 CAPSULE | Refills: 2 | Status: SHIPPED | OUTPATIENT
Start: 2024-11-07 | End: 2025-02-05

## 2024-11-07 RX ORDER — HYDROXYZINE HYDROCHLORIDE 25 MG/1
25 TABLET, FILM COATED ORAL 3 TIMES DAILY PRN
Qty: 30 TABLET | Refills: 3 | Status: SHIPPED | OUTPATIENT
Start: 2024-11-07

## 2024-11-07 ASSESSMENT — ENCOUNTER SYMPTOMS
NUMBNESS: 0
HEMATURIA: 0
DIZZINESS: 0
HEADACHES: 0
VOMITING: 0
LIGHT-HEADEDNESS: 0
FEVER: 0
SHORTNESS OF BREATH: 0
COUGH: 0
DIARRHEA: 0
DYSURIA: 0
NAUSEA: 0
CHEST TIGHTNESS: 0
CONSTIPATION: 0
ABDOMINAL PAIN: 0
UNEXPECTED WEIGHT CHANGE: 0
BLOOD IN STOOL: 0
CHILLS: 0

## 2024-11-07 ASSESSMENT — PAIN SCALES - GENERAL: PAINLEVEL_OUTOF10: 0-NO PAIN

## 2024-11-21 ENCOUNTER — TELEPHONE (OUTPATIENT)
Dept: ADMISSION | Facility: HOSPITAL | Age: 69
End: 2024-11-21
Payer: MEDICARE

## 2024-11-21 DIAGNOSIS — D45 PV (POLYCYTHEMIA VERA) (MULTI): Primary | ICD-10-CM

## 2024-11-21 RX ORDER — DIAZEPAM 5 MG/1
5 TABLET ORAL DAILY PRN
Qty: 30 TABLET | Refills: 3 | Status: SHIPPED | OUTPATIENT
Start: 2024-11-21

## 2024-11-21 NOTE — TELEPHONE ENCOUNTER
Pt's spouse called requesting a refill on his Valium 5mg take one tablet PO BID as premed for showers and triggers for neuro reactivity. She wants it sent to [facility] on file. Message sent to the team.

## 2024-12-27 PROBLEM — M21.40 FLAT FOOT: Status: RESOLVED | Noted: 2023-11-29 | Resolved: 2024-12-27

## 2024-12-27 NOTE — ASSESSMENT & PLAN NOTE
Polycythemia vera (some evidence  of clonal evolution with a new SF3B1 mutations)  1/2/25 instructed to call if he continues to have the severe skin sensations that are not relieved with antihistamine and diazepam.    Diagnostics:  - none pending at this time   Treatment:  - Continue  mg twice a day on Monday/Wednesday/Friday, remain once a day on Sun/Tues/Thurs/Sat  Disease Monitoring:  - Q 2 month CBC  Supportive Care/Toxicity Management  - Cyproheptadine 4mg 3x daily for itching  - continue prn diazepam for itching prevention with showers  - Gabapentin 600mg 3x daily   - Oral B12 1000mcg daily   Antimicrobial Prophylaxis:  - None at this time   IV access:  - Peripheral Access only

## 2024-12-27 NOTE — PROGRESS NOTES
"Patient ID: Mark Maria is a 69 y.o. male.  Referring Physician: Jeanie To, APRN-CNP  [address] [place] [address]  Primary Care Provider: Saeid Allen MD    Date of Service:  1/2/2025    SUBJECTIVE:  Patient presents today, unaccompanied, for follow up.  Reports feeling ok overall.  He did have 1 bad \"episode\" of the electrical feeling under his skin.  This lasted about 20 minutes and was relieved with diazepam.   He also has intermittent right upper quadrant pain.  It occurs randomly and last a few minutes.       Oncology History Overview Note   Polycythemia Vera:   diagnosis:  originally referred to hematology 7/2017 with elevated red cell mass, mild leukocytosis and elevated platelets.  He had itching and burning symptoms that were worsening with baths as wlel.  No history of thrombosis at the time of presentation.  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  Treatment:  1. hydroxyurea single agent   Well tolerated wtih reasonable control of disease through Fall 2021, when blood counts dropped consdierably, and patient proved exquisitely senstivie to hydroxyurea. Referred to Dr. Ortega with concern for hemolysis, but workup was not suggestive of  introvascular hemolysis. Noted elevated MCV, but did not e low haptoglobin, and was suspicious for stem cell disorder.  peripheral blood testing demonstrated a new SF3B1 mutation in addition to the known JAK2 mutation.       bone marrow biopsy on 3/24/22 showed hypercellular bone marrow (50-60%) with trilineage hematopoiesis and atypical megakaryotytic hyperplasia consistent with a myeloid neoplasm , there was no evidence of myelofibrosis progression.  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  The clinical picture with  nausea counts and symptoms that may be " analogous to B-symptoms, and so treatment as primary myelofibrosis was considered.   - restarted hydroxyurea, 500mg/day on 4/7/22  - Currently on hydroxyurea, 1000mg BID 4/20/23  -11/30/2023 - recurring anemia and borderline anemia - ended up still taking and admitted in 1/2024, but improved on holidng medication  -3/7/2024 - restarted at 500mg/day      PV (polycythemia vera) (Multi)   11/29/2023 Initial Diagnosis    Polycythemia vera (CMS/HCC)        OBJECTIVE:  KPS: Karnofsky Score: 100 - Fully active, able to carry on all pre-disease performed without restriction     Physical Exam  Constitutional:       Appearance: Normal appearance.   HENT:      Head: Normocephalic.   Eyes:      Pupils: Pupils are equal, round, and reactive to light.   Cardiovascular:      Rate and Rhythm: Normal rate and regular rhythm.   Pulmonary:      Effort: Pulmonary effort is normal.      Breath sounds: Normal breath sounds.   Abdominal:      General: Bowel sounds are normal.      Palpations: Abdomen is soft.      Tenderness: There is abdominal tenderness in the right upper quadrant.   Musculoskeletal:         General: Normal range of motion.      Cervical back: Normal range of motion and neck supple.   Lymphadenopathy:      Comments: No lymphadenopathy   Skin:     General: Skin is warm and dry.      Findings: No lesion or rash.   Neurological:      General: No focal deficit present.      Mental Status: He is alert and oriented to person, place, and time. Mental status is at baseline.      Comments: No numbness or tingling   Psychiatric:         Mood and Affect: Mood normal.       Assessment & Plan  PV (polycythemia vera) (Multi)  Polycythemia vera (some evidence  of clonal evolution with a new SF3B1 mutations)  1/2/25 instructed to call if he continues to have the severe skin sensations that are not relieved with antihistamine and diazepam.    Diagnostics:  - none pending at this time   Treatment:  - Continue  mg twice a day on  Monday/Wednesday/Friday, remain once a day on Sun/Tues/Thurs/Sat  Disease Monitoring:  - Q 2 month CBC  Supportive Care/Toxicity Management  - Cyproheptadine 4mg 3x daily for itching  - continue prn diazepam for itching prevention with showers  - Gabapentin 600mg 3x daily   - Oral B12 1000mcg daily   Antimicrobial Prophylaxis:  - None at this time   IV access:  - Peripheral Access only   Right upper quadrant abdominal pain  Tender on deep palpation, but no red flag s/s  Last US in 1/2024 (patient thinks due to the same pain)  Monitor and consider imaging if this continues or becomes more frequent    Current Outpatient Medications   Medication Instructions    albuterol 2.5 mg /3 mL (0.083 %) nebulizer solution INHALE THE CONTENTS OF 1 VIAL (3 ML) VIA NEBULIZER TWICE A DAY    cyproheptadine (PERIACTIN) 4 mg, oral, 3 times daily    diazePAM (VALIUM) 5 mg, oral, Daily PRN    famotidine (Pepcid) 40 mg tablet     gabapentin (NEURONTIN) 600 mg, oral, 3 times daily    hydroxyurea (Hydrea) 500 mg capsule Take 500mg once a day Sun/Tues/Thurs/Sat.  Take 500mg twice a day Mon/Wed/Fri    hydrOXYzine HCL (ATARAX) 25 mg, oral, 3 times daily PRN    Linzess 72 mcg capsule TAKE ONE CAPSULE BY MOUTH ONCE DAILY ON AN EMPTY STOMACH. SWALLOW WHOLE. DO NOT CRUSH OR CHEW    simvastatin (ZOCOR) 20 mg, Nightly      Laboratory:  The pertinent laboratory results were reviewed and discussed with the patient.    Lab Results   Component Value Date    WBC 8.5 11/07/2024    HCT 42.6 11/07/2024    HGB 14.3 11/07/2024     11/07/2024    K 3.8 11/07/2024    CALCIUM 8.5 (L) 11/07/2024     11/07/2024    MG 2.00 02/16/2023    BILITOT 0.8 11/07/2024    ALT 12 11/07/2024    AST 19 11/07/2024    BUN 14 11/07/2024    CREATININE 0.83 11/07/2024      Note: for a comprehensive list of the patient's lab results, access the Results Review activity.    RTC:  Q 2 month MD/SAMANTHA follow up     Jeanie To APRN-CNP

## 2025-01-02 ENCOUNTER — APPOINTMENT (OUTPATIENT)
Dept: HEMATOLOGY/ONCOLOGY | Facility: CLINIC | Age: 70
End: 2025-01-02
Payer: MEDICARE

## 2025-01-02 ENCOUNTER — OFFICE VISIT (OUTPATIENT)
Dept: HEMATOLOGY/ONCOLOGY | Facility: CLINIC | Age: 70
End: 2025-01-02
Payer: MEDICARE

## 2025-01-02 ENCOUNTER — LAB (OUTPATIENT)
Dept: LAB | Facility: CLINIC | Age: 70
End: 2025-01-02
Payer: MEDICARE

## 2025-01-02 VITALS
TEMPERATURE: 97.3 F | HEART RATE: 75 BPM | SYSTOLIC BLOOD PRESSURE: 121 MMHG | BODY MASS INDEX: 31.37 KG/M2 | WEIGHT: 192.9 LBS | OXYGEN SATURATION: 93 % | RESPIRATION RATE: 16 BRPM | DIASTOLIC BLOOD PRESSURE: 76 MMHG

## 2025-01-02 DIAGNOSIS — D45 POLYCYTHEMIA VERA: ICD-10-CM

## 2025-01-02 DIAGNOSIS — R10.11 RIGHT UPPER QUADRANT ABDOMINAL PAIN: ICD-10-CM

## 2025-01-02 DIAGNOSIS — D45 PV (POLYCYTHEMIA VERA) (MULTI): Primary | ICD-10-CM

## 2025-01-02 LAB
ALBUMIN SERPL BCP-MCNC: 4.2 G/DL (ref 3.4–5)
ALP SERPL-CCNC: 64 U/L (ref 33–136)
ALT SERPL W P-5'-P-CCNC: 10 U/L (ref 10–52)
ANION GAP SERPL CALC-SCNC: 12 MMOL/L (ref 10–20)
AST SERPL W P-5'-P-CCNC: 16 U/L (ref 9–39)
BASOPHILS # BLD AUTO: 0.23 X10*3/UL (ref 0–0.1)
BASOPHILS NFR BLD AUTO: 2.3 %
BILIRUB SERPL-MCNC: 0.9 MG/DL (ref 0–1.2)
BUN SERPL-MCNC: 15 MG/DL (ref 6–23)
CALCIUM SERPL-MCNC: 9 MG/DL (ref 8.6–10.3)
CHLORIDE SERPL-SCNC: 106 MMOL/L (ref 98–107)
CO2 SERPL-SCNC: 26 MMOL/L (ref 21–32)
CREAT SERPL-MCNC: 0.96 MG/DL (ref 0.5–1.3)
EGFRCR SERPLBLD CKD-EPI 2021: 85 ML/MIN/1.73M*2
EOSINOPHIL # BLD AUTO: 0.31 X10*3/UL (ref 0–0.7)
EOSINOPHIL NFR BLD AUTO: 3 %
ERYTHROCYTE [DISTWIDTH] IN BLOOD BY AUTOMATED COUNT: 13.5 % (ref 11.5–14.5)
GIANT PLATELETS BLD QL SMEAR: NORMAL
GLUCOSE SERPL-MCNC: 113 MG/DL (ref 74–99)
HCT VFR BLD AUTO: 46.5 % (ref 41–52)
HGB BLD-MCNC: 15.8 G/DL (ref 13.5–17.5)
IMM GRANULOCYTES # BLD AUTO: 0.18 X10*3/UL (ref 0–0.7)
IMM GRANULOCYTES NFR BLD AUTO: 1.8 % (ref 0–0.9)
LDH SERPL L TO P-CCNC: 228 U/L (ref 84–246)
LYMPHOCYTES # BLD AUTO: 1.45 X10*3/UL (ref 1.2–4.8)
LYMPHOCYTES NFR BLD AUTO: 14.2 %
MCH RBC QN AUTO: 37.9 PG (ref 26–34)
MCHC RBC AUTO-ENTMCNC: 34 G/DL (ref 32–36)
MCV RBC AUTO: 112 FL (ref 80–100)
MONOCYTES # BLD AUTO: 0.73 X10*3/UL (ref 0.1–1)
MONOCYTES NFR BLD AUTO: 7.2 %
NEUTROPHILS # BLD AUTO: 7.29 X10*3/UL (ref 1.2–7.7)
NEUTROPHILS NFR BLD AUTO: 71.5 %
NRBC BLD-RTO: ABNORMAL /100{WBCS}
PLATELET # BLD AUTO: 320 X10*3/UL (ref 150–450)
POLYCHROMASIA BLD QL SMEAR: NORMAL
POTASSIUM SERPL-SCNC: 4.2 MMOL/L (ref 3.5–5.3)
PROT SERPL-MCNC: 7.1 G/DL (ref 6.4–8.2)
RBC # BLD AUTO: 4.17 X10*6/UL (ref 4.5–5.9)
RBC MORPH BLD: NORMAL
SODIUM SERPL-SCNC: 140 MMOL/L (ref 136–145)
STOMATOCYTES BLD QL SMEAR: NORMAL
WBC # BLD AUTO: 10.2 X10*3/UL (ref 4.4–11.3)

## 2025-01-02 PROCEDURE — 1126F AMNT PAIN NOTED NONE PRSNT: CPT | Performed by: NURSE PRACTITIONER

## 2025-01-02 PROCEDURE — 85025 COMPLETE CBC W/AUTO DIFF WBC: CPT

## 2025-01-02 PROCEDURE — 99215 OFFICE O/P EST HI 40 MIN: CPT | Performed by: NURSE PRACTITIONER

## 2025-01-02 PROCEDURE — 1160F RVW MEDS BY RX/DR IN RCRD: CPT | Performed by: NURSE PRACTITIONER

## 2025-01-02 PROCEDURE — 83615 LACTATE (LD) (LDH) ENZYME: CPT

## 2025-01-02 PROCEDURE — 1159F MED LIST DOCD IN RCRD: CPT | Performed by: NURSE PRACTITIONER

## 2025-01-02 PROCEDURE — 84155 ASSAY OF PROTEIN SERUM: CPT

## 2025-01-02 PROCEDURE — 36415 COLL VENOUS BLD VENIPUNCTURE: CPT

## 2025-01-02 PROCEDURE — G2211 COMPLEX E/M VISIT ADD ON: HCPCS | Performed by: NURSE PRACTITIONER

## 2025-01-02 RX ORDER — GABAPENTIN 300 MG/1
600 CAPSULE ORAL 3 TIMES DAILY
Qty: 180 CAPSULE | Refills: 11 | Status: SHIPPED | OUTPATIENT
Start: 2025-01-02 | End: 2025-12-28

## 2025-01-02 ASSESSMENT — PAIN SCALES - GENERAL: PAINLEVEL_OUTOF10: 0-NO PAIN

## 2025-01-02 NOTE — ASSESSMENT & PLAN NOTE
Tender on deep palpation, but no red flag s/s  Last US in 1/2024 (patient thinks due to the same pain)  Monitor and consider imaging if this continues or becomes more frequent

## 2025-01-18 DIAGNOSIS — D45 POLYCYTHEMIA VERA: ICD-10-CM

## 2025-01-18 DIAGNOSIS — D45 PV (POLYCYTHEMIA VERA) (MULTI): ICD-10-CM

## 2025-01-18 RX ORDER — HYDROXYZINE HYDROCHLORIDE 25 MG/1
25 TABLET, FILM COATED ORAL 3 TIMES DAILY PRN
Qty: 30 TABLET | Refills: 3 | Status: SHIPPED | OUTPATIENT
Start: 2025-01-18

## 2025-02-20 ENCOUNTER — LAB (OUTPATIENT)
Dept: LAB | Facility: CLINIC | Age: 70
End: 2025-02-20
Payer: MEDICARE

## 2025-02-20 ENCOUNTER — OFFICE VISIT (OUTPATIENT)
Dept: HEMATOLOGY/ONCOLOGY | Facility: CLINIC | Age: 70
End: 2025-02-20
Payer: MEDICARE

## 2025-02-20 VITALS
RESPIRATION RATE: 16 BRPM | HEART RATE: 89 BPM | OXYGEN SATURATION: 92 % | TEMPERATURE: 97.3 F | BODY MASS INDEX: 31.02 KG/M2 | SYSTOLIC BLOOD PRESSURE: 126 MMHG | WEIGHT: 190.7 LBS | DIASTOLIC BLOOD PRESSURE: 71 MMHG

## 2025-02-20 DIAGNOSIS — D45 PV (POLYCYTHEMIA VERA) (MULTI): ICD-10-CM

## 2025-02-20 DIAGNOSIS — D45 POLYCYTHEMIA VERA: ICD-10-CM

## 2025-02-20 DIAGNOSIS — R59.0 LEFT CERVICAL LYMPHADENOPATHY: Primary | ICD-10-CM

## 2025-02-20 LAB
ALBUMIN SERPL BCP-MCNC: 4.5 G/DL (ref 3.4–5)
ALP SERPL-CCNC: 77 U/L (ref 33–136)
ALT SERPL W P-5'-P-CCNC: 9 U/L (ref 10–52)
ANION GAP SERPL CALC-SCNC: 11 MMOL/L (ref 10–20)
AST SERPL W P-5'-P-CCNC: 14 U/L (ref 9–39)
BASOPHILS # BLD AUTO: 0.09 X10*3/UL (ref 0–0.1)
BASOPHILS NFR BLD AUTO: 0.6 %
BILIRUB SERPL-MCNC: 0.7 MG/DL (ref 0–1.2)
BUN SERPL-MCNC: 23 MG/DL (ref 6–23)
CALCIUM SERPL-MCNC: 9.4 MG/DL (ref 8.6–10.3)
CHLORIDE SERPL-SCNC: 103 MMOL/L (ref 98–107)
CO2 SERPL-SCNC: 27 MMOL/L (ref 21–32)
CREAT SERPL-MCNC: 0.99 MG/DL (ref 0.5–1.3)
EGFRCR SERPLBLD CKD-EPI 2021: 82 ML/MIN/1.73M*2
EOSINOPHIL # BLD AUTO: 0.03 X10*3/UL (ref 0–0.7)
EOSINOPHIL NFR BLD AUTO: 0.2 %
ERYTHROCYTE [DISTWIDTH] IN BLOOD BY AUTOMATED COUNT: 13.9 % (ref 11.5–14.5)
GLUCOSE SERPL-MCNC: 121 MG/DL (ref 74–99)
HCT VFR BLD AUTO: 42.7 % (ref 41–52)
HGB BLD-MCNC: 14.5 G/DL (ref 13.5–17.5)
IMM GRANULOCYTES # BLD AUTO: 0.17 X10*3/UL (ref 0–0.7)
IMM GRANULOCYTES NFR BLD AUTO: 1.2 % (ref 0–0.9)
LDH SERPL L TO P-CCNC: 254 U/L (ref 84–246)
LYMPHOCYTES # BLD AUTO: 0.74 X10*3/UL (ref 1.2–4.8)
LYMPHOCYTES NFR BLD AUTO: 5.2 %
MCH RBC QN AUTO: 37.2 PG (ref 26–34)
MCHC RBC AUTO-ENTMCNC: 34 G/DL (ref 32–36)
MCV RBC AUTO: 110 FL (ref 80–100)
MONOCYTES # BLD AUTO: 1.13 X10*3/UL (ref 0.1–1)
MONOCYTES NFR BLD AUTO: 7.9 %
NEUTROPHILS # BLD AUTO: 12.07 X10*3/UL (ref 1.2–7.7)
NEUTROPHILS NFR BLD AUTO: 84.9 %
PLATELET # BLD AUTO: 389 X10*3/UL (ref 150–450)
POTASSIUM SERPL-SCNC: 4.4 MMOL/L (ref 3.5–5.3)
PROT SERPL-MCNC: 7 G/DL (ref 6.4–8.2)
RBC # BLD AUTO: 3.9 X10*6/UL (ref 4.5–5.9)
SODIUM SERPL-SCNC: 137 MMOL/L (ref 136–145)
WBC # BLD AUTO: 14.2 X10*3/UL (ref 4.4–11.3)

## 2025-02-20 PROCEDURE — 1159F MED LIST DOCD IN RCRD: CPT | Performed by: INTERNAL MEDICINE

## 2025-02-20 PROCEDURE — 99213 OFFICE O/P EST LOW 20 MIN: CPT | Performed by: INTERNAL MEDICINE

## 2025-02-20 PROCEDURE — 1126F AMNT PAIN NOTED NONE PRSNT: CPT | Performed by: INTERNAL MEDICINE

## 2025-02-20 PROCEDURE — 83615 LACTATE (LD) (LDH) ENZYME: CPT

## 2025-02-20 PROCEDURE — G2211 COMPLEX E/M VISIT ADD ON: HCPCS | Performed by: INTERNAL MEDICINE

## 2025-02-20 PROCEDURE — 80053 COMPREHEN METABOLIC PANEL: CPT

## 2025-02-20 PROCEDURE — 85025 COMPLETE CBC W/AUTO DIFF WBC: CPT

## 2025-02-20 PROCEDURE — 36415 COLL VENOUS BLD VENIPUNCTURE: CPT

## 2025-02-20 ASSESSMENT — PAIN SCALES - GENERAL: PAINLEVEL_OUTOF10: 0-NO PAIN

## 2025-02-20 ASSESSMENT — ENCOUNTER SYMPTOMS
SHORTNESS OF BREATH: 0
BACK PAIN: 0
FEVER: 0
DIFFICULTY URINATING: 0
SORE THROAT: 0
FATIGUE: 0
LIGHT-HEADEDNESS: 0
APPETITE CHANGE: 0
ABDOMINAL PAIN: 0
CONFUSION: 0
JOINT SWELLING: 0
BRUISES/BLEEDS EASILY: 0
CONSTIPATION: 0
FLANK PAIN: 0
NAUSEA: 0
ADENOPATHY: 0
ACTIVITY CHANGE: 0
WEAKNESS: 0
NERVOUS/ANXIOUS: 0
SINUS PAIN: 0
DIARRHEA: 0
SINUS PRESSURE: 0
COUGH: 0
RHINORRHEA: 0
CHILLS: 0
HEADACHES: 0
VOMITING: 0
NUMBNESS: 0
UNEXPECTED WEIGHT CHANGE: 0
DYSPHORIC MOOD: 0
DIAPHORESIS: 0

## 2025-02-20 NOTE — LETTER
2025      TO: MD Saeid Castellon MD  [address] [facility] [address]       Regarding:   Patient:  :  Visit Date: Mark Maria  1955  2025       Dear Dr. Saeid Allen MD     I saw our mutual patient Mark Maria for an interval visit in the malignant hematology clinic at [facility].  Please see below for my notes from this encounter. Please do not hesitate to call me if you have any questions. I look forward to continuing to follow your patient along with you.      Sincerely,    Moi Andrade MD         CC: Saeid Allen MD  [address] [facility] [address]  Via Fax: [phone]    Jeanie To, APRN-CNP  [address] [facility] [address]  Via In Basket    Saeid Allne MD  [address] [facility] [address]       Please see my documentation below:   Patient ID: Mark Maria is a 69 y.o. male.  Referring Physician: Jeanie To, APRN-CNP  [address] [id number]  Primary Care Provider: Saeid Allen MD    Date of Service:  2025    Oncology History Overview Note   Polycythemia Vera:   diagnosis:  originally referred to hematology 2017 with elevated red cell mass, mild leukocytosis and elevated platelets.  He had itching and burning symptoms that were worsening with baths as wlel.  No history of thrombosis at the time of presentation.  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  Treatment:  1. hydroxyurea single agent   Well tolerated wtih reasonable control of disease through 2021, when blood counts dropped consdierably, and patient proved exquisitely senstivie to hydroxyurea. Referred to Dr. Ortega with concern for hemolysis, but workup was  not suggestive of  introvascular hemolysis. Noted elevated MCV, but did not e low haptoglobin, and was suspicious for stem cell disorder.  peripheral blood testing demonstrated a new SF3B1 mutation in addition to the known JAK2 mutation.       bone marrow biopsy on 3/24/22 showed hypercellular bone marrow (50-60%) with trilineage hematopoiesis and atypical megakaryotytic hyperplasia consistent with a myeloid neoplasm , there was no evidence of myelofibrosis progression.  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  The clinical picture with  nausea counts and symptoms that may be analogous to B-symptoms, and so treatment as primary myelofibrosis was considered.   - restarted hydroxyurea, 500mg/day on 4/7/22  - Currently on hydroxyurea, 1000mg BID 4/20/23  -11/30/2023 - recurring anemia and borderline anemia - ended up still taking and admitted in 1/2024, but improved on holidng medication  -3/7/2024 - restarted at 500mg/day      PV (polycythemia vera) (Multi)   11/29/2023 Initial Diagnosis    Polycythemia vera (CMS/HCC)          Overall, doing well.  Has not had major health events.  Reports good adherence with his hydroxyurea    Itching is rare and bearable.  Noted that some pain in the neck after yawning the other day.         Assessment & Plan  PV (polycythemia vera) (Multi)  Polycythemia vera (some evidence  of clonal evolution with a new SF3B1 mutations)  2/20/25: Only requiring intermittent phlebotomy, reports that itching episodes are fairly well-controlled.    Diagnostics:  - none pending at this time   Treatment:  - Continue  mg twice a day on Monday/Wednesday/Friday, remain once a day on Sun/Tues/Thurs/Sat  Disease Monitoring:  - Q 2 month CBC  Supportive Care/Toxicity Management  - Cyproheptadine 4mg 3x daily for itching  - continue prn diazepam for itching prevention with  showers  - Gabapentin 600mg 3x daily   - Oral B12 1000mcg daily   Antimicrobial Prophylaxis:  - None at this time   IV access:  - Peripheral Access only   Left cervical lymphadenopathy  2/20/2025: Feels that he noticed a mass on the left side of his neck, noticed it while yawning and noticed has bothered him, as he noticed that he does not feel that on the right side.  On personal exam it seems that he may have a left anterior cervical lymph node, that notably is nontender, difficult to estimate the size.  Oral exam was unremarkable.  I think the easiest approach would be to get a CT of the neck to see if there is any evidence of abnormal lymphadenopathy, and that might require additional workup.  -CT neck             Subjective    Reviewed and Past Medical History, Past Surgical History, Family History, and Social History:    Review of Systems   Constitutional:  Negative for activity change, appetite change, chills, diaphoresis, fatigue, fever and unexpected weight change.   HENT:  Negative for congestion, mouth sores, nosebleeds, rhinorrhea, sinus pressure, sinus pain and sore throat.    Eyes:  Negative for visual disturbance.   Respiratory:  Negative for cough and shortness of breath.    Cardiovascular:  Negative for chest pain and leg swelling.   Gastrointestinal:  Negative for abdominal pain, constipation, diarrhea, nausea and vomiting.   Genitourinary:  Negative for difficulty urinating and flank pain.   Musculoskeletal:  Negative for back pain and joint swelling.   Skin:  Negative for rash.   Neurological:  Negative for weakness, light-headedness, numbness and headaches.   Hematological:  Negative for adenopathy. Does not bruise/bleed easily.   Psychiatric/Behavioral:  Negative for confusion and dysphoric mood. The patient is not nervous/anxious.        Home Medications and Adherence Reviewed with Patient.       Objective     VS:  /71 (BP Location: Right arm, Patient Position: Sitting, BP Cuff Size:  Adult)   Pulse 89   Temp 36.3 °C (97.3 °F) (Temporal)   Resp 16   Wt 86.5 kg (190 lb 11.2 oz)   SpO2 92%   BMI 31.02 kg/m²   BSA: 2 meters squared  KPS: 100    Physical Exam  Constitutional:       Appearance: Normal appearance.   HENT:      Mouth/Throat:      Mouth: Mucous membranes are moist.   Eyes:      Conjunctiva/sclera: Conjunctivae normal.      Pupils: Pupils are equal, round, and reactive to light.   Abdominal:      General: Abdomen is flat.      Palpations: Abdomen is soft.   Musculoskeletal:         General: Normal range of motion.   Lymphadenopathy:      Head:      Right side of head: No submental, submandibular, tonsillar, preauricular, posterior auricular or occipital adenopathy.      Left side of head: Submandibular adenopathy present. No submental, tonsillar, preauricular, posterior auricular or occipital adenopathy.      Cervical: No cervical adenopathy.      Right cervical: No superficial, deep or posterior cervical adenopathy.     Left cervical: No superficial, deep or posterior cervical adenopathy.   Skin:     General: Skin is warm and dry.   Neurological:      General: No focal deficit present.      Mental Status: He is oriented to person, place, and time.   Psychiatric:         Mood and Affect: Mood normal.         Behavior: Behavior normal.         Laboratory:  Pertinent laboratory results were reviewed and discussed with patient, notably:   White blood cell count today was 14.20, with a mild neutrophilia and mild monocytosis.  MCV was 110, hematocrit was 42.7, platelets were 389    Chemistries largely within normal limits for nonfasting CMP and LFTs        Moi Andrade MD

## 2025-02-20 NOTE — ASSESSMENT & PLAN NOTE
Polycythemia vera (some evidence  of clonal evolution with a new SF3B1 mutations)  2/20/25: Only requiring intermittent phlebotomy, reports that itching episodes are fairly well-controlled.    Diagnostics:  - none pending at this time   Treatment:  - Continue  mg twice a day on Monday/Wednesday/Friday, remain once a day on Sun/Tues/Thurs/Sat  Disease Monitoring:  - Q 2 month CBC  Supportive Care/Toxicity Management  - Cyproheptadine 4mg 3x daily for itching  - continue prn diazepam for itching prevention with showers  - Gabapentin 600mg 3x daily   - Oral B12 1000mcg daily   Antimicrobial Prophylaxis:  - None at this time   IV access:  - Peripheral Access only

## 2025-02-20 NOTE — Clinical Note
February 22, 2025     JESICA Rae-CNP  [id number] [id number]    Patient: Mark Maria   YOB: 1955   Date of Visit: 2/20/2025       Dear Dr. Jeanie To, JESICACNP:    Thank you for referring Mark Maria to me for evaluation. Below are my notes for this consultation.  If you have questions, please do not hesitate to call me. I look forward to following your patient along with you.       Sincerely,     Moi Andrade MD      CC: No Recipients  ______________________________________________________________________________________    Patient ID: Mark Maria is a 69 y.o. male.  Referring Physician: JESICA Rae-CNP  [id number] [id number]  Primary Care Provider: Saeid Allen MD    Date of Service:  2/20/2025    Oncology History Overview Note   Polycythemia Vera:   diagnosis:  originally referred to hematology 7/2017 with elevated red cell mass, mild leukocytosis and elevated platelets.  He had itching and burning symptoms that were worsening with baths as wlel.  No history of thrombosis at the time of presentation.  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  Treatment:  1. hydroxyurea single agent   Well tolerated wtih reasonable control of disease through Fall 2021, when blood counts dropped consdierably, and patient proved exquisitely senstivie to hydroxyurea. Referred to Dr. Ortega with concern for hemolysis, but workup was not suggestive of  introvascular hemolysis. Noted elevated MCV, but did not e low haptoglobin, and was suspicious for stem cell disorder.  peripheral blood testing demonstrated a new SF3B1 mutation in addition to the known JAK2 mutation.       bone marrow biopsy on 3/24/22 showed hypercellular bone marrow (50-60%) with trilineage hematopoiesis and atypical megakaryotytic hyperplasia consistent with a myeloid neoplasm , there was no evidence of  myelofibrosis progression.  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  The clinical picture with  nausea counts and symptoms that may be analogous to B-symptoms, and so treatment as primary myelofibrosis was considered.   - restarted hydroxyurea, 500mg/day on 4/7/22  - Currently on hydroxyurea, 1000mg BID 4/20/23  -11/30/2023 - recurring anemia and borderline anemia - ended up still taking and admitted in 1/2024, but improved on holidng medication  -3/7/2024 - restarted at 500mg/day      PV (polycythemia vera) (Multi)   11/29/2023 Initial Diagnosis    Polycythemia vera (CMS/HCC)          Overall, doing well.  Has not had major health events.  Reports good adherence with his hydroxyurea    Itching is rare and bearable.  Noted that some pain in the neck after yawning the other day.         Assessment & Plan  PV (polycythemia vera) (Multi)  Polycythemia vera (some evidence  of clonal evolution with a new SF3B1 mutations)  2/20/25: Only requiring intermittent phlebotomy, reports that itching episodes are fairly well-controlled.    Diagnostics:  - none pending at this time   Treatment:  - Continue  mg twice a day on Monday/Wednesday/Friday, remain once a day on Sun/Tues/Thurs/Sat  Disease Monitoring:  - Q 2 month CBC  Supportive Care/Toxicity Management  - Cyproheptadine 4mg 3x daily for itching  - continue prn diazepam for itching prevention with showers  - Gabapentin 600mg 3x daily   - Oral B12 1000mcg daily   Antimicrobial Prophylaxis:  - None at this time   IV access:  - Peripheral Access only   Left cervical lymphadenopathy  2/20/2025: Feels that he noticed a mass on the left side of his neck, noticed it while yawning and noticed has bothered him, as he noticed that he does not feel that on the right side.  On personal exam it seems that he may have a left anterior cervical lymph  node, that notably is nontender, difficult to estimate the size.  Oral exam was unremarkable.  I think the easiest approach would be to get a CT of the neck to see if there is any evidence of abnormal lymphadenopathy, and that might require additional workup.  -CT neck             Subjective    Reviewed and Past Medical History, Past Surgical History, Family History, and Social History:    Review of Systems   Constitutional:  Negative for activity change, appetite change, chills, diaphoresis, fatigue, fever and unexpected weight change.   HENT:  Negative for congestion, mouth sores, nosebleeds, rhinorrhea, sinus pressure, sinus pain and sore throat.    Eyes:  Negative for visual disturbance.   Respiratory:  Negative for cough and shortness of breath.    Cardiovascular:  Negative for chest pain and leg swelling.   Gastrointestinal:  Negative for abdominal pain, constipation, diarrhea, nausea and vomiting.   Genitourinary:  Negative for difficulty urinating and flank pain.   Musculoskeletal:  Negative for back pain and joint swelling.   Skin:  Negative for rash.   Neurological:  Negative for weakness, light-headedness, numbness and headaches.   Hematological:  Negative for adenopathy. Does not bruise/bleed easily.   Psychiatric/Behavioral:  Negative for confusion and dysphoric mood. The patient is not nervous/anxious.        Home Medications and Adherence Reviewed with Patient.       Objective     VS:  /71 (BP Location: Right arm, Patient Position: Sitting, BP Cuff Size: Adult)   Pulse 89   Temp 36.3 °C (97.3 °F) (Temporal)   Resp 16   Wt 86.5 kg (190 lb 11.2 oz)   SpO2 92%   BMI 31.02 kg/m²   BSA: 2 meters squared  KPS: 100    Physical Exam  Constitutional:       Appearance: Normal appearance.   HENT:      Mouth/Throat:      Mouth: Mucous membranes are moist.   Eyes:      Conjunctiva/sclera: Conjunctivae normal.      Pupils: Pupils are equal, round, and reactive to light.   Abdominal:      General:  Abdomen is flat.      Palpations: Abdomen is soft.   Musculoskeletal:         General: Normal range of motion.   Lymphadenopathy:      Head:      Right side of head: No submental, submandibular, tonsillar, preauricular, posterior auricular or occipital adenopathy.      Left side of head: Submandibular adenopathy present. No submental, tonsillar, preauricular, posterior auricular or occipital adenopathy.      Cervical: No cervical adenopathy.      Right cervical: No superficial, deep or posterior cervical adenopathy.     Left cervical: No superficial, deep or posterior cervical adenopathy.   Skin:     General: Skin is warm and dry.   Neurological:      General: No focal deficit present.      Mental Status: He is oriented to person, place, and time.   Psychiatric:         Mood and Affect: Mood normal.         Behavior: Behavior normal.         Laboratory:  Pertinent laboratory results were reviewed and discussed with patient, notably:   White blood cell count today was 14.20, with a mild neutrophilia and mild monocytosis.  MCV was 110, hematocrit was 42.7, platelets were 389    Chemistries largely within normal limits for nonfasting CMP and LFTs        Moi Andrade MD

## 2025-02-20 NOTE — PROGRESS NOTES
Patient ID: Mark Maria is a 69 y.o. male.  Referring Physician: Jeanie To, APRN-CNP  [address] [place] [address]  Primary Care Provider: Saeid Allen MD    Date of Service:  2/20/2025    Oncology History Overview Note   Polycythemia Vera:   diagnosis:  originally referred to hematology 7/2017 with elevated red cell mass, mild leukocytosis and elevated platelets.  He had itching and burning symptoms that were worsening with baths as wlel.  No history of thrombosis at the time of presentation.  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  Treatment:  1. hydroxyurea single agent   Well tolerated wtih reasonable control of disease through Fall 2021, when blood counts dropped consdierably, and patient proved exquisitely senstivie to hydroxyurea. Referred to Dr. Ortega with concern for hemolysis, but workup was not suggestive of  introvascular hemolysis. Noted elevated MCV, but did not e low haptoglobin, and was suspicious for stem cell disorder.  peripheral blood testing demonstrated a new SF3B1 mutation in addition to the known JAK2 mutation.       bone marrow biopsy on 3/24/22 showed hypercellular bone marrow (50-60%) with trilineage hematopoiesis and atypical megakaryotytic hyperplasia consistent with a myeloid neoplasm , there was no evidence of myelofibrosis progression.  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  The clinical picture with  nausea counts and symptoms that may be analogous to B-symptoms, and so treatment as primary myelofibrosis was considered.   - restarted hydroxyurea, 500mg/day on 4/7/22  - Currently on hydroxyurea, 1000mg BID 4/20/23  -11/30/2023 - recurring anemia and borderline anemia - ended up still taking and admitted in 1/2024, but improved on holidng medication  -3/7/2024 - restarted at  500mg/day      PV (polycythemia vera) (Multi)   11/29/2023 Initial Diagnosis    Polycythemia vera (CMS/HCC)          Overall, doing well.  Has not had major health events.  Reports good adherence with his hydroxyurea    Itching is rare and bearable.  Noted that some pain in the neck after yawning the other day.         Assessment & Plan  PV (polycythemia vera) (Multi)  Polycythemia vera (some evidence  of clonal evolution with a new SF3B1 mutations)  2/20/25: Only requiring intermittent phlebotomy, reports that itching episodes are fairly well-controlled.    Diagnostics:  - none pending at this time   Treatment:  - Continue  mg twice a day on Monday/Wednesday/Friday, remain once a day on Sun/Tues/Thurs/Sat  Disease Monitoring:  - Q 2 month CBC  Supportive Care/Toxicity Management  - Cyproheptadine 4mg 3x daily for itching  - continue prn diazepam for itching prevention with showers  - Gabapentin 600mg 3x daily   - Oral B12 1000mcg daily   Antimicrobial Prophylaxis:  - None at this time   IV access:  - Peripheral Access only   Left cervical lymphadenopathy  2/20/2025: Feels that he noticed a mass on the left side of his neck, noticed it while yawning and noticed has bothered him, as he noticed that he does not feel that on the right side.  On personal exam it seems that he may have a left anterior cervical lymph node, that notably is nontender, difficult to estimate the size.  Oral exam was unremarkable.  I think the easiest approach would be to get a CT of the neck to see if there is any evidence of abnormal lymphadenopathy, and that might require additional workup.  -CT neck             Subjective     Reviewed and Past Medical History, Past Surgical History, Family History, and Social History:    Review of Systems   Constitutional:  Negative for activity change, appetite change, chills, diaphoresis, fatigue, fever and unexpected weight change.   HENT:  Negative for congestion, mouth sores, nosebleeds,  rhinorrhea, sinus pressure, sinus pain and sore throat.    Eyes:  Negative for visual disturbance.   Respiratory:  Negative for cough and shortness of breath.    Cardiovascular:  Negative for chest pain and leg swelling.   Gastrointestinal:  Negative for abdominal pain, constipation, diarrhea, nausea and vomiting.   Genitourinary:  Negative for difficulty urinating and flank pain.   Musculoskeletal:  Negative for back pain and joint swelling.   Skin:  Negative for rash.   Neurological:  Negative for weakness, light-headedness, numbness and headaches.   Hematological:  Negative for adenopathy. Does not bruise/bleed easily.   Psychiatric/Behavioral:  Negative for confusion and dysphoric mood. The patient is not nervous/anxious.        Home Medications and Adherence Reviewed with Patient.       Objective      VS:  /71 (BP Location: Right arm, Patient Position: Sitting, BP Cuff Size: Adult)   Pulse 89   Temp 36.3 °C (97.3 °F) (Temporal)   Resp 16   Wt 86.5 kg (190 lb 11.2 oz)   SpO2 92%   BMI 31.02 kg/m²   BSA: 2 meters squared  KPS: 100    Physical Exam  Constitutional:       Appearance: Normal appearance.   HENT:      Mouth/Throat:      Mouth: Mucous membranes are moist.   Eyes:      Conjunctiva/sclera: Conjunctivae normal.      Pupils: Pupils are equal, round, and reactive to light.   Abdominal:      General: Abdomen is flat.      Palpations: Abdomen is soft.   Musculoskeletal:         General: Normal range of motion.   Lymphadenopathy:      Head:      Right side of head: No submental, submandibular, tonsillar, preauricular, posterior auricular or occipital adenopathy.      Left side of head: Submandibular adenopathy present. No submental, tonsillar, preauricular, posterior auricular or occipital adenopathy.      Cervical: No cervical adenopathy.      Right cervical: No superficial, deep or posterior cervical adenopathy.     Left cervical: No superficial, deep or posterior cervical adenopathy.   Skin:      General: Skin is warm and dry.   Neurological:      General: No focal deficit present.      Mental Status: He is oriented to person, place, and time.   Psychiatric:         Mood and Affect: Mood normal.         Behavior: Behavior normal.         Laboratory:  Pertinent laboratory results were reviewed and discussed with patient, notably:   White blood cell count today was 14.20, with a mild neutrophilia and mild monocytosis.  MCV was 110, hematocrit was 42.7, platelets were 389    Chemistries largely within normal limits for nonfasting CMP and LFTs        Moi Andrade MD

## 2025-02-21 ENCOUNTER — HOSPITAL ENCOUNTER (OUTPATIENT)
Dept: RADIOLOGY | Facility: HOSPITAL | Age: 70
Discharge: HOME | End: 2025-02-21
Payer: MEDICARE

## 2025-02-21 DIAGNOSIS — R59.0 LEFT CERVICAL LYMPHADENOPATHY: ICD-10-CM

## 2025-02-21 PROCEDURE — 70491 CT SOFT TISSUE NECK W/DYE: CPT

## 2025-02-21 PROCEDURE — 2550000001 HC RX 255 CONTRASTS: Performed by: INTERNAL MEDICINE

## 2025-02-21 RX ADMIN — IOHEXOL 60 ML: 350 INJECTION, SOLUTION INTRAVENOUS at 14:23

## 2025-04-01 ENCOUNTER — TELEPHONE (OUTPATIENT)
Dept: ADMISSION | Facility: HOSPITAL | Age: 70
End: 2025-04-01
Payer: MEDICARE

## 2025-04-01 DIAGNOSIS — D45 PV (POLYCYTHEMIA VERA) (MULTI): ICD-10-CM

## 2025-04-01 NOTE — TELEPHONE ENCOUNTER
Refill Request  Hydroxyzine (Atarax) 25mg 3 times daily PRN  Diazepam (Valium) 5mg     Preferred Pharmacy  [id number]

## 2025-04-02 DIAGNOSIS — D45 PV (POLYCYTHEMIA VERA) (MULTI): ICD-10-CM

## 2025-04-02 DIAGNOSIS — D45 POLYCYTHEMIA VERA: ICD-10-CM

## 2025-04-02 RX ORDER — HYDROXYUREA 500 MG/1
CAPSULE ORAL
Qty: 132 CAPSULE | Refills: 3 | Status: SHIPPED | OUTPATIENT
Start: 2025-04-02

## 2025-04-02 RX ORDER — DIAZEPAM 5 MG/1
5 TABLET ORAL DAILY PRN
Qty: 30 TABLET | Refills: 3 | Status: SHIPPED | OUTPATIENT
Start: 2025-04-02 | End: 2025-04-02 | Stop reason: SDUPTHER

## 2025-04-02 RX ORDER — DIAZEPAM 5 MG/1
5 TABLET ORAL DAILY PRN
Qty: 30 TABLET | Refills: 3 | Status: SHIPPED | OUTPATIENT
Start: 2025-04-02

## 2025-04-02 RX ORDER — HYDROXYZINE HYDROCHLORIDE 25 MG/1
25 TABLET, FILM COATED ORAL 3 TIMES DAILY PRN
Qty: 30 TABLET | Refills: 3 | Status: SHIPPED | OUTPATIENT
Start: 2025-04-02

## 2025-04-02 NOTE — ASSESSMENT & PLAN NOTE
Polycythemia vera (some evidence  of clonal evolution with a new SF3B1 mutations)  4/10/25: Only requiring intermittent phlebotomy, reports that itching episodes are well-controlled.    Myeloproliferative Neoplasm Total Symptom Score:   Fatigue (weariness, tiredness) What number describes your WORST level of fatigue during past 24 hours 4   Early Satiety (Filling up quickly) 0   Abdominal Discomfort 0   Inactivity 0   Problems with concentration, compared to prior to MPN diagnosis 0   Itching (pruritis) 9   Dizziness 0   Bone pain (diffuse not joint pain or  arthritis) 4   Fever (> 100F or 38 C) 0   Unintentional Weight Loss last six months 0    Total = 17     Diagnostics:  - none pending at this time   Treatment:  - Continue  mg twice a day on Monday/Wednesday/Friday, remain once a day on Sun/Tues/Thurs/Sat  - Continue as needed phlebotomies  Disease Monitoring:  - Q 2 month CBC  Supportive Care/Toxicity Management  - Cyproheptadine 4mg 3x daily for itching  - Continue prn diazepam for itching prevention with showers  - Gabapentin 600mg 3x daily   - Oral B12 1000mcg daily   Antimicrobial Prophylaxis:  - None at this time   IV access:  - Peripheral Access only

## 2025-04-02 NOTE — PROGRESS NOTES
Patient ID: Mark Maria is a 69 y.o. male.  Referring Physician: Moi Andrade MD  [address] [place] [address]  Primary Care Provider: Saeid Allen MD    Date of Service:  4/10/2025    SUBJECTIVE:  Patient presents today, unaccompanied, for follow up.  Reports feeling ok. He is going on vacation this week to [place].  He does have occasional itching, but nothing terrible.  Diphenhydramine relieves it.  Reports he may have missed 1 or 2 doses of hydroxyurea, but is not totally sure.       Oncology History Overview Note   Polycythemia Vera:   diagnosis:  originally referred to hematology 7/2017 with elevated red cell mass, mild leukocytosis and elevated platelets.  He had itching and burning symptoms that were worsening with baths as wlel.  No history of thrombosis at the time of presentation.  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  Treatment:  1. hydroxyurea single agent   Well tolerated wtih reasonable control of disease through Fall 2021, when blood counts dropped consdierably, and patient proved exquisitely senstivie to hydroxyurea. Referred to Dr. Ortega with concern for hemolysis, but workup was not suggestive of  introvascular hemolysis. Noted elevated MCV, but did not e low haptoglobin, and was suspicious for stem cell disorder.  peripheral blood testing demonstrated a new SF3B1 mutation in addition to the known JAK2 mutation.       bone marrow biopsy on 3/24/22 showed hypercellular bone marrow (50-60%) with trilineage hematopoiesis and atypical megakaryotytic hyperplasia consistent with a myeloid neoplasm , there was no evidence of myelofibrosis progression.  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  The clinical picture with  nausea counts and symptoms that may be analogous to B-symptoms, and so  treatment as primary myelofibrosis was considered.   - restarted hydroxyurea, 500mg/day on 4/7/22  - Currently on hydroxyurea, 1000mg BID 4/20/23  -11/30/2023 - recurring anemia and borderline anemia - ended up still taking and admitted in 1/2024, but improved on holidng medication  -3/7/2024 - restarted at 500mg/day      PV (polycythemia vera) (Multi)   11/29/2023 Initial Diagnosis    Polycythemia vera (CMS/HCC)        OBJECTIVE:  KPS: Karnofsky Score: 100 - Fully active, able to carry on all pre-disease performed without restriction     Physical Exam  Constitutional:       Appearance: Normal appearance.   HENT:      Head: Normocephalic.      Mouth/Throat:      Mouth: Mucous membranes are moist.      Pharynx: Oropharynx is clear. No oropharyngeal exudate.   Eyes:      Pupils: Pupils are equal, round, and reactive to light.   Cardiovascular:      Rate and Rhythm: Normal rate and regular rhythm.      Pulses: Normal pulses.      Heart sounds: Normal heart sounds.   Pulmonary:      Effort: Pulmonary effort is normal.      Breath sounds: Normal breath sounds.   Abdominal:      General: Bowel sounds are normal.      Palpations: Abdomen is soft.   Musculoskeletal:         General: Normal range of motion.      Cervical back: Normal range of motion and neck supple.      Right lower leg: No edema.      Left lower leg: No edema.   Lymphadenopathy:      Comments: No lymphadenopathy   Skin:     General: Skin is warm and dry.      Findings: No lesion or rash.   Neurological:      General: No focal deficit present.      Mental Status: He is alert and oriented to person, place, and time. Mental status is at baseline.      Comments: No numbness or tingling   Psychiatric:         Mood and Affect: Mood normal.       Assessment & Plan  PV (polycythemia vera) (Multi)  Polycythemia vera (some evidence  of clonal evolution with a new SF3B1 mutations)  4/10/25: Only requiring intermittent phlebotomy, reports that itching episodes are  well-controlled.    Myeloproliferative Neoplasm Total Symptom Score:   Fatigue (weariness, tiredness) What number describes your WORST level of fatigue during past 24 hours 4   Early Satiety (Filling up quickly) 0   Abdominal Discomfort 0   Inactivity 0   Problems with concentration, compared to prior to MPN diagnosis 0   Itching (pruritis) 9   Dizziness 0   Bone pain (diffuse not joint pain or  arthritis) 4   Fever (> 100F or 38 C) 0   Unintentional Weight Loss last six months 0    Total = 17     Diagnostics:  - none pending at this time   Treatment:  - Continue  mg twice a day on Monday/Wednesday/Friday, remain once a day on Sun/Tues/Thurs/Sat  - Continue as needed phlebotomies  Disease Monitoring:  - Q 2 month CBC  Supportive Care/Toxicity Management  - Cyproheptadine 4mg 3x daily for itching  - Continue prn diazepam for itching prevention with showers  - Gabapentin 600mg 3x daily   - Oral B12 1000mcg daily   Antimicrobial Prophylaxis:  - None at this time   IV access:  - Peripheral Access only     RTC:  Q 2 month MD/SAMANTHA follow up     JESICA Rae-CNP

## 2025-04-10 ENCOUNTER — OFFICE VISIT (OUTPATIENT)
Dept: HEMATOLOGY/ONCOLOGY | Facility: CLINIC | Age: 70
End: 2025-04-10
Payer: MEDICARE

## 2025-04-10 ENCOUNTER — LAB (OUTPATIENT)
Dept: LAB | Facility: CLINIC | Age: 70
End: 2025-04-10
Payer: MEDICARE

## 2025-04-10 VITALS
BODY MASS INDEX: 30.94 KG/M2 | RESPIRATION RATE: 17 BRPM | TEMPERATURE: 98.2 F | HEART RATE: 76 BPM | WEIGHT: 190.26 LBS | SYSTOLIC BLOOD PRESSURE: 122 MMHG | DIASTOLIC BLOOD PRESSURE: 83 MMHG | OXYGEN SATURATION: 94 %

## 2025-04-10 DIAGNOSIS — D45 POLYCYTHEMIA VERA: ICD-10-CM

## 2025-04-10 DIAGNOSIS — D45 PV (POLYCYTHEMIA VERA) (MULTI): Primary | ICD-10-CM

## 2025-04-10 DIAGNOSIS — R59.0 LEFT CERVICAL LYMPHADENOPATHY: ICD-10-CM

## 2025-04-10 PROBLEM — R10.11 RIGHT UPPER QUADRANT ABDOMINAL PAIN: Status: RESOLVED | Noted: 2025-01-02 | Resolved: 2025-04-10

## 2025-04-10 LAB
ALBUMIN SERPL BCP-MCNC: 4.5 G/DL (ref 3.4–5)
ALP SERPL-CCNC: 54 U/L (ref 33–136)
ALT SERPL W P-5'-P-CCNC: 12 U/L (ref 10–52)
ANION GAP SERPL CALC-SCNC: 11 MMOL/L (ref 10–20)
AST SERPL W P-5'-P-CCNC: 18 U/L (ref 9–39)
BASOPHILS # BLD AUTO: 0.25 X10*3/UL (ref 0–0.1)
BASOPHILS NFR BLD AUTO: 2.5 %
BILIRUB SERPL-MCNC: 1 MG/DL (ref 0–1.2)
BUN SERPL-MCNC: 20 MG/DL (ref 6–23)
CALCIUM SERPL-MCNC: 9.3 MG/DL (ref 8.6–10.3)
CHLORIDE SERPL-SCNC: 103 MMOL/L (ref 98–107)
CO2 SERPL-SCNC: 28 MMOL/L (ref 21–32)
CREAT SERPL-MCNC: 0.93 MG/DL (ref 0.5–1.3)
EGFRCR SERPLBLD CKD-EPI 2021: 88 ML/MIN/1.73M*2
EOSINOPHIL # BLD AUTO: 0.27 X10*3/UL (ref 0–0.7)
EOSINOPHIL NFR BLD AUTO: 2.7 %
ERYTHROCYTE [DISTWIDTH] IN BLOOD BY AUTOMATED COUNT: 14 % (ref 11.5–14.5)
GLUCOSE SERPL-MCNC: 97 MG/DL (ref 74–99)
HCT VFR BLD AUTO: 46.5 % (ref 41–52)
HGB BLD-MCNC: 15.6 G/DL (ref 13.5–17.5)
IMM GRANULOCYTES # BLD AUTO: 0.12 X10*3/UL (ref 0–0.7)
IMM GRANULOCYTES NFR BLD AUTO: 1.2 % (ref 0–0.9)
LDH SERPL L TO P-CCNC: 293 U/L (ref 84–246)
LYMPHOCYTES # BLD AUTO: 1.1 X10*3/UL (ref 1.2–4.8)
LYMPHOCYTES NFR BLD AUTO: 11 %
MCH RBC QN AUTO: 37.3 PG (ref 26–34)
MCHC RBC AUTO-ENTMCNC: 33.5 G/DL (ref 32–36)
MCV RBC AUTO: 111 FL (ref 80–100)
MONOCYTES # BLD AUTO: 0.58 X10*3/UL (ref 0.1–1)
MONOCYTES NFR BLD AUTO: 5.8 %
NEUTROPHILS # BLD AUTO: 7.64 X10*3/UL (ref 1.2–7.7)
NEUTROPHILS NFR BLD AUTO: 76.8 %
PLATELET # BLD AUTO: 342 X10*3/UL (ref 150–450)
POTASSIUM SERPL-SCNC: 4.4 MMOL/L (ref 3.5–5.3)
PROT SERPL-MCNC: 7.1 G/DL (ref 6.4–8.2)
RBC # BLD AUTO: 4.18 X10*6/UL (ref 4.5–5.9)
SODIUM SERPL-SCNC: 138 MMOL/L (ref 136–145)
WBC # BLD AUTO: 10 X10*3/UL (ref 4.4–11.3)

## 2025-04-10 PROCEDURE — 85025 COMPLETE CBC W/AUTO DIFF WBC: CPT

## 2025-04-10 PROCEDURE — 1160F RVW MEDS BY RX/DR IN RCRD: CPT | Performed by: NURSE PRACTITIONER

## 2025-04-10 PROCEDURE — 1126F AMNT PAIN NOTED NONE PRSNT: CPT | Performed by: NURSE PRACTITIONER

## 2025-04-10 PROCEDURE — 99215 OFFICE O/P EST HI 40 MIN: CPT | Performed by: NURSE PRACTITIONER

## 2025-04-10 PROCEDURE — 1159F MED LIST DOCD IN RCRD: CPT | Performed by: NURSE PRACTITIONER

## 2025-04-10 PROCEDURE — 36415 COLL VENOUS BLD VENIPUNCTURE: CPT

## 2025-04-10 PROCEDURE — 84075 ASSAY ALKALINE PHOSPHATASE: CPT

## 2025-04-10 PROCEDURE — G2211 COMPLEX E/M VISIT ADD ON: HCPCS | Performed by: NURSE PRACTITIONER

## 2025-04-10 PROCEDURE — 83615 LACTATE (LD) (LDH) ENZYME: CPT

## 2025-04-10 ASSESSMENT — PAIN SCALES - GENERAL: PAINLEVEL_OUTOF10: 0-NO PAIN

## 2025-06-12 ENCOUNTER — LAB (OUTPATIENT)
Dept: LAB | Facility: CLINIC | Age: 70
End: 2025-06-12
Payer: MEDICARE

## 2025-06-12 ENCOUNTER — OFFICE VISIT (OUTPATIENT)
Dept: HEMATOLOGY/ONCOLOGY | Facility: CLINIC | Age: 70
End: 2025-06-12
Payer: MEDICARE

## 2025-06-12 VITALS
OXYGEN SATURATION: 93 % | BODY MASS INDEX: 29.44 KG/M2 | TEMPERATURE: 97.7 F | SYSTOLIC BLOOD PRESSURE: 111 MMHG | WEIGHT: 181 LBS | RESPIRATION RATE: 16 BRPM | DIASTOLIC BLOOD PRESSURE: 76 MMHG | HEART RATE: 82 BPM

## 2025-06-12 DIAGNOSIS — D45 POLYCYTHEMIA VERA: ICD-10-CM

## 2025-06-12 DIAGNOSIS — D45 PV (POLYCYTHEMIA VERA) (MULTI): ICD-10-CM

## 2025-06-12 LAB
ALBUMIN SERPL BCP-MCNC: 4.3 G/DL (ref 3.4–5)
ALP SERPL-CCNC: 68 U/L (ref 33–136)
ALT SERPL W P-5'-P-CCNC: 11 U/L (ref 10–52)
ANION GAP SERPL CALC-SCNC: 10 MMOL/L (ref 10–20)
AST SERPL W P-5'-P-CCNC: 20 U/L (ref 9–39)
BASOPHILS # BLD AUTO: 0.24 X10*3/UL (ref 0–0.1)
BASOPHILS NFR BLD AUTO: 2.6 %
BILIRUB SERPL-MCNC: 0.8 MG/DL (ref 0–1.2)
BUN SERPL-MCNC: 17 MG/DL (ref 6–23)
CALCIUM SERPL-MCNC: 9 MG/DL (ref 8.6–10.3)
CHLORIDE SERPL-SCNC: 105 MMOL/L (ref 98–107)
CO2 SERPL-SCNC: 26 MMOL/L (ref 21–32)
CREAT SERPL-MCNC: 0.93 MG/DL (ref 0.5–1.3)
EGFRCR SERPLBLD CKD-EPI 2021: 88 ML/MIN/1.73M*2
EOSINOPHIL # BLD AUTO: 0.34 X10*3/UL (ref 0–0.7)
EOSINOPHIL NFR BLD AUTO: 3.6 %
ERYTHROCYTE [DISTWIDTH] IN BLOOD BY AUTOMATED COUNT: 15.1 % (ref 11.5–14.5)
GLUCOSE SERPL-MCNC: 94 MG/DL (ref 74–99)
HCT VFR BLD AUTO: 41.9 % (ref 41–52)
HGB BLD-MCNC: 14.3 G/DL (ref 13.5–17.5)
IMM GRANULOCYTES # BLD AUTO: 0.13 X10*3/UL (ref 0–0.7)
IMM GRANULOCYTES NFR BLD AUTO: 1.4 % (ref 0–0.9)
LDH SERPL L TO P-CCNC: 267 U/L (ref 84–246)
LYMPHOCYTES # BLD AUTO: 1.31 X10*3/UL (ref 1.2–4.8)
LYMPHOCYTES NFR BLD AUTO: 14 %
MCH RBC QN AUTO: 38.4 PG (ref 26–34)
MCHC RBC AUTO-ENTMCNC: 34.1 G/DL (ref 32–36)
MCV RBC AUTO: 113 FL (ref 80–100)
MONOCYTES # BLD AUTO: 0.58 X10*3/UL (ref 0.1–1)
MONOCYTES NFR BLD AUTO: 6.2 %
NEUTROPHILS # BLD AUTO: 6.76 X10*3/UL (ref 1.2–7.7)
NEUTROPHILS NFR BLD AUTO: 72.2 %
NRBC BLD-RTO: ABNORMAL /100{WBCS}
PLATELET # BLD AUTO: 346 X10*3/UL (ref 150–450)
POTASSIUM SERPL-SCNC: 4.3 MMOL/L (ref 3.5–5.3)
PROT SERPL-MCNC: 6.8 G/DL (ref 6.4–8.2)
RBC # BLD AUTO: 3.72 X10*6/UL (ref 4.5–5.9)
SODIUM SERPL-SCNC: 137 MMOL/L (ref 136–145)
WBC # BLD AUTO: 9.4 X10*3/UL (ref 4.4–11.3)

## 2025-06-12 PROCEDURE — 1160F RVW MEDS BY RX/DR IN RCRD: CPT | Performed by: INTERNAL MEDICINE

## 2025-06-12 PROCEDURE — 1126F AMNT PAIN NOTED NONE PRSNT: CPT | Performed by: INTERNAL MEDICINE

## 2025-06-12 PROCEDURE — 99213 OFFICE O/P EST LOW 20 MIN: CPT | Performed by: INTERNAL MEDICINE

## 2025-06-12 PROCEDURE — G2211 COMPLEX E/M VISIT ADD ON: HCPCS | Performed by: INTERNAL MEDICINE

## 2025-06-12 PROCEDURE — 36415 COLL VENOUS BLD VENIPUNCTURE: CPT

## 2025-06-12 PROCEDURE — 80048 BASIC METABOLIC PNL TOTAL CA: CPT

## 2025-06-12 PROCEDURE — 83615 LACTATE (LD) (LDH) ENZYME: CPT

## 2025-06-12 PROCEDURE — 85025 COMPLETE CBC W/AUTO DIFF WBC: CPT

## 2025-06-12 PROCEDURE — 1159F MED LIST DOCD IN RCRD: CPT | Performed by: INTERNAL MEDICINE

## 2025-06-12 ASSESSMENT — PAIN SCALES - GENERAL: PAINLEVEL_OUTOF10: 0-NO PAIN

## 2025-06-12 NOTE — ASSESSMENT & PLAN NOTE
Polycythemia vera (some evidence  of clonal evolution with a new SF3B1 mutations)  6/12/25: Only requiring intermittent phlebotomy, reports that itching episodes are well-controlled.    Myeloproliferative Neoplasm Total Symptom Score:   Fatigue (weariness, tiredness) What number describes your WORST level of fatigue during past 24 hours 4   Early Satiety (Filling up quickly) 0   Abdominal Discomfort 0   Inactivity 0   Problems with concentration, compared to prior to MPN diagnosis 0   Itching (pruritis) 9   Dizziness 0   Bone pain (diffuse not joint pain or  arthritis) 4   Fever (> 100F or 38 C) 0   Unintentional Weight Loss last six months 0    Total = 17     Diagnostics:  - none pending at this time   Treatment:  - Continue  mg twice a day on Monday/Wednesday/Friday, remain once a day on Sun/Tues/Thurs/Sat  - Continue as needed phlebotomies  Disease Monitoring:  - Q 2 month CBC  Supportive Care/Toxicity Management  - Cyproheptadine 4mg 3x daily for itching  - Continue prn diazepam for itching prevention with showers  - Gabapentin 600mg 3x daily   - Oral B12 1000mcg daily   Antimicrobial Prophylaxis:  - None at this time   IV access:  - Peripheral Access only

## 2025-06-12 NOTE — PROGRESS NOTES
Patient ID: Mark Maria is a 69 y.o. male.  Referring Physician: Jeanie To, APRN-CNP  [id number] [place] [address]  Primary Care Provider: Saeid Allen MD    Date of Service:  6/12/2025    Oncology History Overview Note   Polycythemia Vera:   diagnosis:  originally referred to hematology 7/2017 with elevated red cell mass, mild leukocytosis and elevated platelets.  He had itching and burning symptoms that were worsening with baths as wlel.  No history of thrombosis at the time of presentation.  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  Treatment:  1. hydroxyurea single agent   Well tolerated wtih reasonable control of disease through Fall 2021, when blood counts dropped consdierably, and patient proved exquisitely senstivie to hydroxyurea. Referred to Dr. Ortega with concern for hemolysis, but workup was not suggestive of  introvascular hemolysis. Noted elevated MCV, but did not e low haptoglobin, and was suspicious for stem cell disorder.  peripheral blood testing demonstrated a new SF3B1 mutation in addition to the known JAK2 mutation.       bone marrow biopsy on 3/24/22 showed hypercellular bone marrow (50-60%) with trilineage hematopoiesis and atypical megakaryotytic hyperplasia consistent with a myeloid neoplasm , there was no evidence of myelofibrosis progression.  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  The clinical picture with  nausea counts and symptoms that may be analogous to B-symptoms, and so treatment as primary myelofibrosis was considered.   - restarted hydroxyurea, 500mg/day on 4/7/22  - Currently on hydroxyurea, 1000mg BID 4/20/23  -11/30/2023 - recurring anemia and borderline anemia - ended up still taking and admitted in 1/2024, but improved on holidng medication  -3/7/2024 - restarted at  500mg/day      PV (polycythemia vera) (Multi)   11/29/2023 Initial Diagnosis    Polycythemia vera (CMS/HCC)          Life is going well.  No major health events.  Notes that with the heat of the summer, rudy does need to have more showers and taking     Is a little more tingly than normal - bu tnot suree why.  However, his brother just enrolled in hospice, so there. Noted there was some preventative medicine.  Worried about his lung transplant, nothing to take care of that virus.          Assessment & Plan  PV (polycythemia vera) (Multi)  Polycythemia vera (some evidence  of clonal evolution with a new SF3B1 mutations)  6/12/25: Only requiring intermittent phlebotomy, reports that itching episodes are well-controlled.      Diagnostics:  - none pending at this time   Treatment:  - Continue  mg twice a day on Monday/Wednesday/Friday, remain once a day on Sun/Tues/Thurs/Sat  - Continue as needed phlebotomies  Disease Monitoring:  - Q 2 month CBC  Supportive Care/Toxicity Management  - Cyproheptadine 4mg 3x daily for itching  - Continue prn diazepam for itching prevention with showers  - Gabapentin 600mg 3x daily   - Oral B12 1000mcg daily   Antimicrobial Prophylaxis:  - None at this time   IV access:  - Peripheral Access only              Subjective     Reviewed and Past Medical History, Past Surgical History, Family History, and Social History:    Review of Systems    Home Medications and Adherence Reviewed with Patient. ***      Objective      VS:  /76   Pulse 82   Temp 36.5 °C (97.7 °F) (Temporal)   Resp 16   Wt 82.1 kg (181 lb)   SpO2 93%   BMI 29.44 kg/m²   BSA: 1.95 meters squared  KPS: ***    Physical Exam    Laboratory:  Pertinent laboratory results were reviewed and discussed with patient, notably:   ***      Pathology:  The pertinent pathology results were reviewed and discussed with the patient.  Notably, ***.    Imaging:  The pertinent imaging results were reviewed and discussed with the  patient.  Notably, ***        Moi Andrade MD     14.3  Plts 346  WBC 9.6            Moi Andrade MD

## 2025-06-12 NOTE — ASSESSMENT & PLAN NOTE
Polycythemia vera (some evidence  of clonal evolution with a new SF3B1 mutations)  6/12/25: Only requiring intermittent phlebotomy, reports that itching episodes are well-controlled.      Diagnostics:  - none pending at this time   Treatment:  - Continue  mg twice a day on Monday/Wednesday/Friday, remain once a day on Sun/Tues/Thurs/Sat  - Continue as needed phlebotomies  Disease Monitoring:  - Q 2 month CBC  Supportive Care/Toxicity Management  - Cyproheptadine 4mg 3x daily for itching  - Continue prn diazepam for itching prevention with showers  - Gabapentin 600mg 3x daily   - Oral B12 1000mcg daily   Antimicrobial Prophylaxis:  - None at this time   IV access:  - Peripheral Access only

## 2025-06-13 LAB — PATH REVIEW-CBC DIFFERENTIAL: NORMAL

## 2025-06-17 ASSESSMENT — ENCOUNTER SYMPTOMS
SINUS PAIN: 0
FEVER: 0
VOMITING: 0
CONSTIPATION: 0
HEADACHES: 0
NUMBNESS: 0
SHORTNESS OF BREATH: 0
CHILLS: 0
FATIGUE: 0
ACTIVITY CHANGE: 0
DIAPHORESIS: 0
BACK PAIN: 0
RHINORRHEA: 0
UNEXPECTED WEIGHT CHANGE: 0
APPETITE CHANGE: 0
CONFUSION: 0
NERVOUS/ANXIOUS: 0
SORE THROAT: 0
DYSPHORIC MOOD: 0
ABDOMINAL PAIN: 0
FLANK PAIN: 0
JOINT SWELLING: 0
SINUS PRESSURE: 0
WEAKNESS: 0
COUGH: 0
BRUISES/BLEEDS EASILY: 0
DIARRHEA: 0
NAUSEA: 0
ADENOPATHY: 0
DIFFICULTY URINATING: 0
LIGHT-HEADEDNESS: 0

## 2025-06-23 DIAGNOSIS — D45 PV (POLYCYTHEMIA VERA) (MULTI): Primary | ICD-10-CM

## 2025-07-18 ENCOUNTER — TELEPHONE (OUTPATIENT)
Dept: ADMISSION | Facility: HOSPITAL | Age: 70
End: 2025-07-18
Payer: MEDICARE

## 2025-07-18 DIAGNOSIS — D45 PV (POLYCYTHEMIA VERA) (MULTI): ICD-10-CM

## 2025-07-18 RX ORDER — HYDROXYZINE HYDROCHLORIDE 25 MG/1
25 TABLET, FILM COATED ORAL 3 TIMES DAILY PRN
Qty: 30 TABLET | Refills: 3 | Status: SHIPPED | OUTPATIENT
Start: 2025-07-18